# Patient Record
Sex: FEMALE | Race: WHITE | NOT HISPANIC OR LATINO | Employment: UNEMPLOYED | URBAN - METROPOLITAN AREA
[De-identification: names, ages, dates, MRNs, and addresses within clinical notes are randomized per-mention and may not be internally consistent; named-entity substitution may affect disease eponyms.]

---

## 2017-01-24 ENCOUNTER — OFFICE VISIT (OUTPATIENT)
Dept: OBSTETRICS AND GYNECOLOGY | Facility: CLINIC | Age: 67
End: 2017-01-24
Attending: OBSTETRICS & GYNECOLOGY
Payer: COMMERCIAL

## 2017-01-24 VITALS
BODY MASS INDEX: 27.22 KG/M2 | HEIGHT: 61 IN | WEIGHT: 144.19 LBS | DIASTOLIC BLOOD PRESSURE: 68 MMHG | SYSTOLIC BLOOD PRESSURE: 104 MMHG

## 2017-01-24 DIAGNOSIS — Z01.419 WELL WOMAN EXAM WITH ROUTINE GYNECOLOGICAL EXAM: Primary | ICD-10-CM

## 2017-01-24 DIAGNOSIS — Z79.890 POSTMENOPAUSAL HRT (HORMONE REPLACEMENT THERAPY): ICD-10-CM

## 2017-01-24 DIAGNOSIS — Z90.722 HISTORY OF TOTAL HYSTERECTOMY WITH BILATERAL SALPINGO-OOPHORECTOMY (BSO): ICD-10-CM

## 2017-01-24 DIAGNOSIS — Z12.31 VISIT FOR SCREENING MAMMOGRAM: ICD-10-CM

## 2017-01-24 DIAGNOSIS — Z90.710 HISTORY OF TOTAL HYSTERECTOMY WITH BILATERAL SALPINGO-OOPHORECTOMY (BSO): ICD-10-CM

## 2017-01-24 DIAGNOSIS — Z90.79 HISTORY OF TOTAL HYSTERECTOMY WITH BILATERAL SALPINGO-OOPHORECTOMY (BSO): ICD-10-CM

## 2017-01-24 PROCEDURE — 99999 PR PBB SHADOW E&M-EST. PATIENT-LVL III: CPT | Mod: PBBFAC,,, | Performed by: OBSTETRICS & GYNECOLOGY

## 2017-01-24 PROCEDURE — 99396 PREV VISIT EST AGE 40-64: CPT | Mod: S$GLB,,, | Performed by: OBSTETRICS & GYNECOLOGY

## 2017-01-24 RX ORDER — ESTRADIOL 1 MG/1
1 TABLET ORAL DAILY
Qty: 90 TABLET | Refills: 4 | Status: SHIPPED | OUTPATIENT
Start: 2017-01-24 | End: 2018-02-03 | Stop reason: SDUPTHER

## 2017-01-24 RX ORDER — LISINOPRIL AND HYDROCHLOROTHIAZIDE 10; 12.5 MG/1; MG/1
TABLET ORAL
Refills: 3 | COMMUNITY
Start: 2017-01-10 | End: 2019-04-02

## 2017-01-24 NOTE — PROGRESS NOTES
Emily John is a 66 y.o. year old  who presents for annual exam.  She is S/P SOBEIDA / BSO, currently taking Estrace 1 mg daily.  She continues to do well on ERT- no bleeding, no hot flashes.  Previously evaluated at [facility] for right breast mass 10/6/2016 with dx mammogram and breast ultrasound.  Denies changes in her medical / surgical history over the past year.  No GYN complaints.    Pap 2014: Negative    Past Medical History   Diagnosis Date    Acid reflux     Hypertension     Mental disorder        Past Surgical History   Procedure Laterality Date     section      Hysterectomy      Ankle surgery      Rotator cuff repair      Finger surgery       thumb       OB History      Para Term  AB TAB SAB Ectopic Multiple Living    2 2                  ROS:  GENERAL: Reports weight loss with diet.   SKIN: Denies rash or lesions.   HEAD: Denies head injury or headache.   NODES: Denies enlarged lymph nodes.   CHEST: Denies chest pain or shortness of breath.   CARDIOVASCULAR: Denies palpitations or left sided chest pain.   ABDOMEN: No abdominal pain, nausea.  Reports some constipation.  URINARY: No dysuria or hematuria.  REPRODUCTIVE: See HPI.   BREASTS: Denies pain or nipple discharge.  Reports right breast lump unchanged.   HEMATOLOGIC: No easy bruisability or excessive bleeding.   MUSCULOSKELETAL: Denies joint pain or swelling.   NEUROLOGIC: Denies syncope or weakness.   PSYCHIATRIC: Denies depression.    PE:  (chaperone present during entire exam)  APPEARANCE: Well nourished, well developed, in no acute distress.  NODES: No inguinal lymph node enlargement.  ABDOMEN: Soft. No tenderness or masses. No hernias.  BREASTS: Symmetrical, no skin changes or visible lesions. Right breast with 4-5 cm mass at 11:00 (unchanged from prior exam).  Otherwise, no palpable masses, nipple discharge or adenopathy bilaterally.  PELVIC: Atrophic external female genitalia without lesions. Normal hair  distribution. Adequate perineal body, normal urethral meatus. Vagina atrophic without lesions or discharge. No significant cystocele or rectocele. Uterus and cervix surgically absent. Bimanual exam revealed no masses, tenderness or abnormality.  ANUS: Normal.    Diagnosis:  1. Well woman exam with routine gynecological exam    2. Postmenopausal HRT (hormone replacement therapy)    3. History of total hysterectomy with bilateral salpingo-oophorectomy (BSO)    4. Visit for screening mammogram          PLAN:    Orders Placed This Encounter    Mammo Digital Screening Bilat with Arsen without CAD    estradiol (ESTRACE) 1 MG tablet       Patient was counseled today on postmenopausal issues including her usage of ERT: r / b / studies, WHI.  She is doing well on Estrace and desires to continue.  We also discussed the right breast mass previously evaluated at [facility].  She will have screening mammogram performed 1/31/17.    Follow-up in 1 year.

## 2017-01-31 ENCOUNTER — HOSPITAL ENCOUNTER (OUTPATIENT)
Dept: RADIOLOGY | Facility: HOSPITAL | Age: 67
Discharge: HOME | End: 2017-01-31
Attending: OBSTETRICS & GYNECOLOGY
Payer: COMMERCIAL

## 2017-01-31 DIAGNOSIS — Z12.31 VISIT FOR SCREENING MAMMOGRAM: ICD-10-CM

## 2017-01-31 PROCEDURE — 77067 SCR MAMMO BI INCL CAD: CPT | Mod: 26,,, | Performed by: RADIOLOGY

## 2017-01-31 PROCEDURE — 77067 SCR MAMMO BI INCL CAD: CPT | Mod: TC

## 2017-02-03 ENCOUNTER — PATIENT MESSAGE (OUTPATIENT)
Dept: OBSTETRICS AND GYNECOLOGY | Facility: CLINIC | Age: 67
End: 2017-02-03

## 2017-02-03 ENCOUNTER — HOSPITAL ENCOUNTER (OUTPATIENT)
Dept: RADIOLOGY | Facility: HOSPITAL | Age: 67
Discharge: HOME | End: 2017-02-03
Attending: OBSTETRICS & GYNECOLOGY
Payer: COMMERCIAL

## 2017-02-03 DIAGNOSIS — R92.8 ABNORMAL MAMMOGRAM OF LEFT BREAST: ICD-10-CM

## 2017-02-03 PROCEDURE — 77061 BREAST TOMOSYNTHESIS UNI: CPT | Mod: 26,LT,, | Performed by: RADIOLOGY

## 2017-02-03 PROCEDURE — 77065 DX MAMMO INCL CAD UNI: CPT | Mod: 26,LT,, | Performed by: RADIOLOGY

## 2017-02-03 PROCEDURE — 77061 BREAST TOMOSYNTHESIS UNI: CPT | Mod: TC,LT

## 2017-10-19 ENCOUNTER — OFFICE VISIT (OUTPATIENT)
Dept: OBSTETRICS AND GYNECOLOGY | Facility: CLINIC | Age: 67
End: 2017-10-19
Attending: OBSTETRICS & GYNECOLOGY
Payer: COMMERCIAL

## 2017-10-19 VITALS
WEIGHT: 141.13 LBS | SYSTOLIC BLOOD PRESSURE: 104 MMHG | HEIGHT: 61 IN | DIASTOLIC BLOOD PRESSURE: 64 MMHG | BODY MASS INDEX: 26.65 KG/M2

## 2017-10-19 DIAGNOSIS — Z78.0 POSTMENOPAUSAL STATUS: ICD-10-CM

## 2017-10-19 DIAGNOSIS — N63.10 BREAST MASS, RIGHT: Primary | ICD-10-CM

## 2017-10-19 PROCEDURE — 99999 PR PBB SHADOW E&M-EST. PATIENT-LVL III: CPT | Mod: PBBFAC,,, | Performed by: OBSTETRICS & GYNECOLOGY

## 2017-10-19 PROCEDURE — 99213 OFFICE O/P EST LOW 20 MIN: CPT | Mod: S$GLB,,, | Performed by: OBSTETRICS & GYNECOLOGY

## 2017-10-19 NOTE — PROGRESS NOTES
Chief Complaint   Patient presents with    Breast Pain       HPI:  Emily John is a 66 y.o. female patient  who presents today for evaluation of a right breast lump.  She has a history of a right breast mass followed at [facility].  Ultrasound 10/2016 revealed the mass to be 4 cm.  Over the past several months, she reports that the mass seems larger and has become tender.  She is now at the point that she may want drainage.  No LMP recorded. Patient has had a hysterectomy.     Breast sono 10/6/16:  Ultrasound is suggestive of an oval simple cyst with well defined, thin margins  measuring 4.2 cm seen in the posterior region of the right breast at 10 o'clock  located 10 centimeters from the nipple.  Internal echotexture is Anechoic.  There is enhancement.  This corresponds to the area of palpable concern.   Impression   Stable simple cyst in the right breast is benign-negative.  This corresponds to the area of palpable concern.  No suspicious finding in the  right breast.     Mammogram 17:  Finding 2:  Stable mass in the right breast is benign-negative.      Past Medical History:   Diagnosis Date    Acid reflux     Hypertension     Mental disorder        Past Surgical History:   Procedure Laterality Date    ANKLE SURGERY       SECTION      FINGER SURGERY      thumb    HYSTERECTOMY      ROTATOR CUFF REPAIR           ROS:  GENERAL: Feeling well overall.   SKIN: Denies rash or lesions.   HEAD: Denies head injury or headache.   NODES: Denies enlarged lymph nodes.   CHEST: Denies chest pain or shortness of breath.   CARDIOVASCULAR: Denies palpitations or left sided chest pain.   ABDOMEN: No abdominal pain, nausea, vomiting or rectal bleeding.   URINARY: No dysuria or hematuria.  REPRODUCTIVE: See HPI.   BREASTS: Reports enlarging tender right breast mass.  HEMATOLOGIC: No easy bruisability or excessive bleeding.   MUSCULOSKELETAL: Denies joint pain or swelling.   NEUROLOGIC: Denies syncope or  weakness.   PSYCHIATRIC: Denies depression.    PE:   (chaperone present during entire exam)  APPEARANCE: Well nourished, well developed, in no acute distress.  BREASTS:  Right breast with prominent 5-6 cm mass at 10:00, mildly tender.  Left breast without dominant masses.  No nipple discharge or adenopathy bilaterally.    Diagnosis:  1. Breast mass, right    2. Postmenopausal status          PLAN:    Orders Placed This Encounter    US Breast Right Complete    Mammo Digital Diagnostic Right with Arsen       Patient was counseled today on right breast mass and the need for follow-up at [facility] for evaluation / treatment.  Imaging studies scheduled 10/24/17.    Follow-up for annual exam    Total time of visit: 15 minutes (counseling >50% of time)

## 2017-10-24 ENCOUNTER — HOSPITAL ENCOUNTER (OUTPATIENT)
Dept: RADIOLOGY | Facility: HOSPITAL | Age: 67
Discharge: HOME | End: 2017-10-24
Attending: OBSTETRICS & GYNECOLOGY
Payer: COMMERCIAL

## 2017-10-24 VITALS — BODY MASS INDEX: 26.62 KG/M2 | HEIGHT: 61 IN | WEIGHT: 141 LBS

## 2017-10-24 DIAGNOSIS — N63.10 BREAST MASS, RIGHT: ICD-10-CM

## 2017-10-24 PROCEDURE — 77061 BREAST TOMOSYNTHESIS UNI: CPT | Mod: TC

## 2017-10-24 PROCEDURE — 76642 ULTRASOUND BREAST LIMITED: CPT | Mod: TC,RT

## 2017-10-24 PROCEDURE — 77065 DX MAMMO INCL CAD UNI: CPT | Mod: 26,RT,, | Performed by: RADIOLOGY

## 2017-10-24 PROCEDURE — 76642 ULTRASOUND BREAST LIMITED: CPT | Mod: 26,RT,, | Performed by: RADIOLOGY

## 2017-10-24 PROCEDURE — 77061 BREAST TOMOSYNTHESIS UNI: CPT | Mod: 26,RT,, | Performed by: RADIOLOGY

## 2017-11-06 ENCOUNTER — HOSPITAL ENCOUNTER (OUTPATIENT)
Dept: RADIOLOGY | Facility: HOSPITAL | Age: 67
Discharge: HOME | End: 2017-11-06
Attending: OBSTETRICS & GYNECOLOGY
Payer: COMMERCIAL

## 2017-11-06 DIAGNOSIS — N63.0 BREAST MASS: ICD-10-CM

## 2017-11-06 PROCEDURE — 19000 PUNCTURE ASPIR CYST BREAST: CPT | Mod: TC

## 2017-11-06 PROCEDURE — 19000 PUNCTURE ASPIR CYST BREAST: CPT | Mod: ,,, | Performed by: RADIOLOGY

## 2017-12-11 ENCOUNTER — HOSPITAL ENCOUNTER (EMERGENCY)
Facility: HOSPITAL | Age: 67
Discharge: HOME | End: 2017-12-11
Attending: EMERGENCY MEDICINE
Payer: MEDICARE

## 2017-12-11 VITALS
TEMPERATURE: 98 F | HEART RATE: 75 BPM | OXYGEN SATURATION: 97 % | DIASTOLIC BLOOD PRESSURE: 82 MMHG | SYSTOLIC BLOOD PRESSURE: 179 MMHG | BODY MASS INDEX: 26.06 KG/M2 | WEIGHT: 138 LBS | HEIGHT: 61 IN | RESPIRATION RATE: 20 BRPM

## 2017-12-11 DIAGNOSIS — S60.459A FOREIGN BODY OF FINGER: Primary | ICD-10-CM

## 2017-12-11 PROCEDURE — 63600175 PHARM REV CODE 636 W HCPCS: Performed by: PHYSICIAN ASSISTANT

## 2017-12-11 PROCEDURE — 25000003 PHARM REV CODE 250: Performed by: PHYSICIAN ASSISTANT

## 2017-12-11 PROCEDURE — 90715 TDAP VACCINE 7 YRS/> IM: CPT | Performed by: PHYSICIAN ASSISTANT

## 2017-12-11 PROCEDURE — 90471 IMMUNIZATION ADMIN: CPT | Performed by: PHYSICIAN ASSISTANT

## 2017-12-11 PROCEDURE — 99283 EMERGENCY DEPT VISIT LOW MDM: CPT

## 2017-12-11 PROCEDURE — 10121 INC&RMVL FB SUBQ TISS COMP: CPT

## 2017-12-11 PROCEDURE — 25000003 PHARM REV CODE 250: Performed by: EMERGENCY MEDICINE

## 2017-12-11 RX ORDER — CEPHALEXIN 250 MG/1
250 CAPSULE ORAL 4 TIMES DAILY
Qty: 28 CAPSULE | Refills: 0 | Status: SHIPPED | OUTPATIENT
Start: 2017-12-11 | End: 2017-12-11

## 2017-12-11 RX ORDER — LIDOCAINE HYDROCHLORIDE 10 MG/ML
10 INJECTION INFILTRATION; PERINEURAL
Status: COMPLETED | OUTPATIENT
Start: 2017-12-11 | End: 2017-12-11

## 2017-12-11 RX ORDER — CEPHALEXIN 250 MG/1
250 CAPSULE ORAL 4 TIMES DAILY
Qty: 28 CAPSULE | Refills: 0 | Status: SHIPPED | OUTPATIENT
Start: 2017-12-11 | End: 2017-12-18

## 2017-12-11 RX ORDER — IBUPROFEN 600 MG/1
600 TABLET ORAL EVERY 6 HOURS PRN
Qty: 20 TABLET | Refills: 0 | Status: SHIPPED | OUTPATIENT
Start: 2017-12-11 | End: 2017-12-11

## 2017-12-11 RX ORDER — CEPHALEXIN 250 MG/1
500 CAPSULE ORAL
Status: COMPLETED | OUTPATIENT
Start: 2017-12-11 | End: 2017-12-11

## 2017-12-11 RX ORDER — IBUPROFEN 600 MG/1
600 TABLET ORAL
Status: COMPLETED | OUTPATIENT
Start: 2017-12-11 | End: 2017-12-11

## 2017-12-11 RX ORDER — IBUPROFEN 600 MG/1
600 TABLET ORAL EVERY 6 HOURS PRN
Qty: 20 TABLET | Refills: 0 | Status: SHIPPED | OUTPATIENT
Start: 2017-12-11

## 2017-12-11 RX ADMIN — CEPHALEXIN 500 MG: 250 CAPSULE ORAL at 06:12

## 2017-12-11 RX ADMIN — CLOSTRIDIUM TETANI TOXOID ANTIGEN (FORMALDEHYDE INACTIVATED), CORYNEBACTERIUM DIPHTHERIAE TOXOID ANTIGEN (FORMALDEHYDE INACTIVATED), BORDETELLA PERTUSSIS TOXOID ANTIGEN (GLUTARALDEHYDE INACTIVATED), BORDETELLA PERTUSSIS FILAMENTOUS HEMAGGLUTININ ANTIGEN (FORMALDEHYDE INACTIVATED), BORDETELLA PERTUSSIS PERTACTIN ANTIGEN, AND BORDETELLA PERTUSSIS FIMBRIAE 2/3 ANTIGEN 0.5 ML: 5; 2; 2.5; 5; 3; 5 INJECTION, SUSPENSION INTRAMUSCULAR at 04:12

## 2017-12-11 RX ADMIN — IBUPROFEN 600 MG: 600 TABLET, FILM COATED ORAL at 06:12

## 2017-12-11 RX ADMIN — LIDOCAINE HYDROCHLORIDE 10 ML: 10 INJECTION, SOLUTION INFILTRATION; PERINEURAL at 04:12

## 2017-12-11 NOTE — ED PROVIDER NOTES
Encounter Date: 2017    SCRIBE #1 NOTE: IRoberto, am scribing for, and in the presence of,  Dianna Brown MD. I have scribed the following portions of the note - Other sections scribed: HPI, ROS.       History     Chief Complaint   Patient presents with    Foreign Body     tip of needle in thumb on left hand while sewing with machine     CC: Foreign Body     67 year old female  has a past medical history of Acid reflux; Breast cyst; Hypertension; and Mental disorder presents to the ED for evaluation of a needle in the ulnar aspect of her left thumb. Pt reports she was using her Embroidery machine when her thumb went under the needle; she reports the needle only penetrated once and broke. She reports associated numbness to her left thumb. Pt's tetanus shot is not up to date. No other symptoms reported. No attempts at medication reported.       The history is provided by the patient. No  was used.     Review of patient's allergies indicates:   Allergen Reactions    Penicillins Other (See Comments)     unknown     Past Medical History:   Diagnosis Date    Acid reflux     Breast cyst     Hypertension     Mental disorder      Past Surgical History:   Procedure Laterality Date    ANKLE SURGERY       SECTION      FINGER SURGERY      thumb    HYSTERECTOMY      OOPHORECTOMY      ROTATOR CUFF REPAIR       Family History   Problem Relation Age of Onset    Ovarian cancer Mother     Breast cancer Neg Hx     Colon cancer Neg Hx      Social History   Substance Use Topics    Smoking status: Former Smoker    Smokeless tobacco: Never Used    Alcohol use No     Review of Systems   Constitutional: Negative for fever.   HENT: Negative for sore throat.    Respiratory: Negative for shortness of breath.    Cardiovascular: Negative for chest pain.   Gastrointestinal: Negative for nausea.   Genitourinary: Negative for dysuria.   Musculoskeletal: Negative for back pain.   Skin:  Negative for rash.        (+) needle in the ulnar aspect of ulnar aspect of left thumb   Neurological: Negative for weakness.   Hematological: Does not bruise/bleed easily.       Physical Exam     Initial Vitals [12/11/17 1359]   BP Pulse Resp Temp SpO2   (!) 154/88 72 20 98.8 °F (37.1 °C) 97 %      MAP       110         Physical Exam    Nursing note and vitals reviewed.  Constitutional: She appears well-developed and well-nourished.   HENT:   Head: Normocephalic and atraumatic.   Nose: Nose normal.   Eyes: EOM and lids are normal.   Neck: Neck supple.   Cardiovascular: Normal rate and regular rhythm.   Pulmonary/Chest: No respiratory distress.   Abdominal: Soft. Normal appearance. There is no tenderness.   Musculoskeletal: Normal range of motion.   Wound w/ dry blood and exposed thread to the ulnar nail fold of the her left thumb  ecchymosis to palmar aspect of left thumb  ROM intact   Neurological: She is alert and oriented to person, place, and time. No sensory deficit.   Skin: Skin is warm and dry.   Psychiatric: She has a normal mood and affect. Her behavior is normal. Thought content normal.         ED Course   Foreign Body  Date/Time: 12/11/2017 5:00 PM  Performed by: CHAVO CADENA.  Authorized by: CHAVO CADENA.   Consent Done: Yes  Consent: Verbal consent obtained.  Risks and benefits: risks, benefits and alternatives were discussed  Consent given by: patient  Patient understanding: patient states understanding of the procedure being performed  Body area: skin  General location: upper extremity  Location details: left thumb  Anesthesia: digital block    Anesthesia:  Local Anesthetic: lidocaine 1% without epinephrine  Anesthetic total: 6 mL  Patient sedated: no  Patient restrained: no  Localization method: probed  Removal mechanism: forceps, hemostat, scalpel and irrigation  Dressing: dressing applied  Depth: deep  Complexity: complex  0 objects recovered.  Post-procedure assessment: foreign body not  removed  Patient tolerance: Patient tolerated the procedure well with no immediate complications  Comments: Wound explored, but unable to locate needle tip.  Case discussed with Dr. Felder from orthopedics who recommends starting patient on Keflex and sending her to his office tomorrow so she can be scheduled for operative removal of foreign body.      Labs Reviewed - No data to display          Medical Decision Making:   Initial Assessment:   Left thumb foreign body  Clinical Tests:   Radiological Study: Ordered and Reviewed  ED Management:  See procedure note.  Unable to locate foreign body with exploration.  Case discussed with Dr. Felder from orthopedics recommends starting on oral antibiotics and discharging to follow-up in orthopedics office in the morning.  Case discussed essentially with the patient who expresses understanding of the treatment plan.  Given prescription for Keflex and ibuprofen for pain and given strict instructions to return to the emergency room for fever, pus from wounds, or inability to see the orthopedic surgeon.  Other:   I discussed test(s) with the performing physician.       <> Summary of the Findings: Case discussed with Dr. Felder as above            Scribe Attestation:   Scribe #1: I performed the above scribed service and the documentation accurately describes the services I performed. I attest to the accuracy of the note.    Attending Attestation:           Physician Attestation for Scribe:  Physician Attestation Statement for Scribe #1: I, Dianna Brown MD, reviewed documentation, as scribed by Roberto Cortes in my presence, and it is both accurate and complete.                 ED Course      Clinical Impression:   The encounter diagnosis was Foreign body of finger.    Disposition:   Disposition: Discharged  Condition: Stable                        Dianna Morse MD  12/11/17 6056

## 2018-02-04 RX ORDER — ESTRADIOL 1 MG/1
TABLET ORAL
Qty: 90 TABLET | Refills: 1 | Status: SHIPPED | OUTPATIENT
Start: 2018-02-04 | End: 2018-03-13 | Stop reason: SDUPTHER

## 2018-03-13 ENCOUNTER — TELEPHONE (OUTPATIENT)
Dept: OBSTETRICS AND GYNECOLOGY | Facility: CLINIC | Age: 68
End: 2018-03-13

## 2018-03-13 ENCOUNTER — OFFICE VISIT (OUTPATIENT)
Dept: OBSTETRICS AND GYNECOLOGY | Facility: CLINIC | Age: 68
End: 2018-03-13
Attending: OBSTETRICS & GYNECOLOGY
Payer: MEDICARE

## 2018-03-13 VITALS
WEIGHT: 145.5 LBS | HEIGHT: 62 IN | BODY MASS INDEX: 26.78 KG/M2 | SYSTOLIC BLOOD PRESSURE: 118 MMHG | DIASTOLIC BLOOD PRESSURE: 74 MMHG

## 2018-03-13 DIAGNOSIS — Z80.41 FAMILY HISTORY OF OVARIAN CANCER: ICD-10-CM

## 2018-03-13 DIAGNOSIS — Z12.31 VISIT FOR SCREENING MAMMOGRAM: ICD-10-CM

## 2018-03-13 DIAGNOSIS — Z79.890 POSTMENOPAUSAL HRT (HORMONE REPLACEMENT THERAPY): Primary | ICD-10-CM

## 2018-03-13 DIAGNOSIS — Z79.890 POSTMENOPAUSAL HRT (HORMONE REPLACEMENT THERAPY): ICD-10-CM

## 2018-03-13 DIAGNOSIS — Z01.419 WELL WOMAN EXAM WITH ROUTINE GYNECOLOGICAL EXAM: Primary | ICD-10-CM

## 2018-03-13 DIAGNOSIS — Z90.710 HISTORY OF HYSTERECTOMY WITH BILATERAL OOPHORECTOMY: ICD-10-CM

## 2018-03-13 DIAGNOSIS — Z90.722 HISTORY OF HYSTERECTOMY WITH BILATERAL OOPHORECTOMY: ICD-10-CM

## 2018-03-13 PROCEDURE — 99999 PR PBB SHADOW E&M-EST. PATIENT-LVL III: CPT | Mod: PBBFAC,,, | Performed by: OBSTETRICS & GYNECOLOGY

## 2018-03-13 PROCEDURE — G0101 CA SCREEN;PELVIC/BREAST EXAM: HCPCS | Mod: S$GLB,,, | Performed by: OBSTETRICS & GYNECOLOGY

## 2018-03-13 RX ORDER — ESTRADIOL 1 MG/1
1 TABLET ORAL DAILY
Qty: 90 TABLET | Refills: 3 | Status: SHIPPED | OUTPATIENT
Start: 2018-03-13 | End: 2019-01-30 | Stop reason: SDUPTHER

## 2018-03-13 NOTE — TELEPHONE ENCOUNTER
----- Message from Indu Fatihll sent at 3/13/2018  3:32 PM CDT -----  Contact: pt  X_  1st Request  _  2nd Request  _  3rd Request    Who:VERONICA MOYA [[id number]]    Why: Patient states she is returning a call to inform the staff that she does need a refill on her hormone medication... Please contact to further discuss and advise     What Number to Call Back: [phone]    When to Expect a call back: (Before the end of the day)   -- if call after 3:00 call back will be tomorrow.

## 2018-03-13 NOTE — PROGRESS NOTES
Emily John is a 67 y.o. year old  female who presents for routine GYN exam.  S/P SOBEIDA / BSO , taking Estrace without any complaints.  No bleeding.  Denies hot flashes / sweats.  S/P aspiration of benign right breast cyst 2017.  S/P recent surgery on left thumb (Dr. Beatty).  She has a family history of ovarian cancer- mother.  No GYN complaints.    Pap 14: Negative    Past Medical History:   Diagnosis Date    Acid reflux     Breast cyst     Hypertension     Mental disorder        Past Surgical History:   Procedure Laterality Date    ANKLE SURGERY       SECTION      FINGER SURGERY      thumb    HYSTERECTOMY      OOPHORECTOMY      ROTATOR CUFF REPAIR         OB History      Para Term  AB Living    3 3 3          SAB TAB Ectopic Multiple Live Births                         ROS:  GENERAL: Feeling well overall.   SKIN: Denies rash or lesions.   HEAD: Denies head injury or headache.   NODES: Denies enlarged lymph nodes.   CHEST: Denies chest pain or shortness of breath.   CARDIOVASCULAR: Denies palpitations or left sided chest pain.   ABDOMEN: No abdominal pain, nausea, vomiting or rectal bleeding.   URINARY: No dysuria or hematuria.  REPRODUCTIVE: See HPI.   BREASTS: Denies pain, lumps, or nipple discharge.   HEMATOLOGIC: No easy bruisability or excessive bleeding.   MUSCULOSKELETAL: Reports discomfort in knees.  NEUROLOGIC: Denies syncope or weakness.   PSYCHIATRIC: Denies depression.     PE:   (chaperone present during entire exam)  APPEARANCE: Well nourished, well developed, in no acute distress.  BREASTS: Symmetrical, no skin changes or visible lesions. No palpable masses, nipple discharge or adenopathy bilaterally.  ABDOMEN: Soft. No tenderness or masses. No hernias. No CVA tenderness.  VULVA: No lesions. Normal female genitalia.  URETHRAL MEATUS: Normal size and location, no lesions, no prolapse.  URETHRA: No masses, tenderness, prolapse or scarring.  VAGINA:  Atrophic.  No lesions, no discharge, no significant cystocele or rectocele.  CERVIX / UTERUS: Surgically absent.  BME: No masses, tenderness or CDS nodularity.  ANUS PERINEUM: Normal.      Diagnosis:  1. Well woman exam with routine gynecological exam    2. History of hysterectomy with bilateral oophorectomy    3. Postmenopausal HRT (hormone replacement therapy)    4. Visit for screening mammogram    5. Family history of ovarian cancer          PLAN:    Orders Placed This Encounter    Mammo Digital Screening Bilat with Tomosynthesis CAD       Patient was counseled today on postmenopausal issues, including her usage of ERT: r / b / studies, WHI.  She is doing well on Estrace and desires to continue.  We also discussed her family history of ovarian cancer with her mother - declines referral to [facility] for genetic consultation.     Follow-up in 1 year.

## 2018-05-16 ENCOUNTER — HOSPITAL ENCOUNTER (OUTPATIENT)
Dept: RADIOLOGY | Facility: HOSPITAL | Age: 68
Discharge: HOME | End: 2018-05-16
Attending: OBSTETRICS & GYNECOLOGY
Payer: MEDICARE

## 2018-05-16 DIAGNOSIS — Z12.31 VISIT FOR SCREENING MAMMOGRAM: ICD-10-CM

## 2018-05-16 PROCEDURE — 77067 SCR MAMMO BI INCL CAD: CPT | Mod: 26,,, | Performed by: RADIOLOGY

## 2018-05-16 PROCEDURE — 77067 SCR MAMMO BI INCL CAD: CPT | Mod: TC,PO

## 2018-05-16 PROCEDURE — 77063 BREAST TOMOSYNTHESIS BI: CPT | Mod: 26,,, | Performed by: RADIOLOGY

## 2018-05-17 ENCOUNTER — PATIENT MESSAGE (OUTPATIENT)
Dept: OBSTETRICS AND GYNECOLOGY | Facility: CLINIC | Age: 68
End: 2018-05-17

## 2018-06-19 NOTE — ED TRIAGE NOTES
Piece of sewing needle stuck in left thumb   Alert and oriented x 3, normal mood and affect, no apparent risk to self or others.

## 2019-01-30 ENCOUNTER — TELEPHONE (OUTPATIENT)
Dept: OBSTETRICS AND GYNECOLOGY | Facility: CLINIC | Age: 69
End: 2019-01-30

## 2019-01-30 DIAGNOSIS — Z79.890 POSTMENOPAUSAL HRT (HORMONE REPLACEMENT THERAPY): ICD-10-CM

## 2019-01-30 RX ORDER — ESTRADIOL 1 MG/1
1 TABLET ORAL DAILY
Qty: 90 TABLET | Refills: 0 | Status: SHIPPED | OUTPATIENT
Start: 2019-01-30 | End: 2019-04-02 | Stop reason: SDUPTHER

## 2019-01-30 NOTE — TELEPHONE ENCOUNTER
----- Message from Porfirio Gaona sent at 1/30/2019  9:33 AM CST -----  Contact: VERONICA MOYA [[id number]]  Can the clinic reply in [facility]:Yes      Please refill the medication(s) listed below. Patient stated that she almost out of medicaition and pharmacy told her she need a new Pre Auth from Physician. Also, pt states that she still had a 90day refill with [employer] and confuse why Pharmacy wont refill. Pt request to do the 90day with [employer]. Please call the patient when the prescription(s) is ready for  at the phone number       Medication #1:estradiol (ESTRACE) 1 MG tablet      Medication #2:       Preferred Pharmacy:[address] [place], [place] [address] [address] [phone]

## 2019-04-02 ENCOUNTER — OFFICE VISIT (OUTPATIENT)
Dept: OBSTETRICS AND GYNECOLOGY | Facility: CLINIC | Age: 69
End: 2019-04-02
Attending: OBSTETRICS & GYNECOLOGY
Payer: MEDICARE

## 2019-04-02 VITALS
HEIGHT: 61 IN | BODY MASS INDEX: 28.89 KG/M2 | WEIGHT: 153 LBS | SYSTOLIC BLOOD PRESSURE: 140 MMHG | DIASTOLIC BLOOD PRESSURE: 70 MMHG

## 2019-04-02 DIAGNOSIS — Z01.419 WELL WOMAN EXAM WITH ROUTINE GYNECOLOGICAL EXAM: Primary | ICD-10-CM

## 2019-04-02 DIAGNOSIS — Z79.890 POSTMENOPAUSAL HRT (HORMONE REPLACEMENT THERAPY): ICD-10-CM

## 2019-04-02 DIAGNOSIS — Z90.710 HISTORY OF HYSTERECTOMY WITH BILATERAL OOPHORECTOMY: ICD-10-CM

## 2019-04-02 DIAGNOSIS — Z90.722 HISTORY OF HYSTERECTOMY WITH BILATERAL OOPHORECTOMY: ICD-10-CM

## 2019-04-02 DIAGNOSIS — Z12.31 VISIT FOR SCREENING MAMMOGRAM: ICD-10-CM

## 2019-04-02 PROCEDURE — G0101 PR CA SCREEN;PELVIC/BREAST EXAM: ICD-10-PCS | Mod: S$GLB,,, | Performed by: OBSTETRICS & GYNECOLOGY

## 2019-04-02 PROCEDURE — 99999 PR PBB SHADOW E&M-EST. PATIENT-LVL III: CPT | Mod: PBBFAC,,, | Performed by: OBSTETRICS & GYNECOLOGY

## 2019-04-02 PROCEDURE — 99999 PR PBB SHADOW E&M-EST. PATIENT-LVL III: ICD-10-PCS | Mod: PBBFAC,,, | Performed by: OBSTETRICS & GYNECOLOGY

## 2019-04-02 PROCEDURE — G0101 CA SCREEN;PELVIC/BREAST EXAM: HCPCS | Mod: S$GLB,,, | Performed by: OBSTETRICS & GYNECOLOGY

## 2019-04-02 RX ORDER — HYDROCHLOROTHIAZIDE 12.5 MG/1
12.5 CAPSULE ORAL DAILY
COMMUNITY

## 2019-04-02 RX ORDER — ESTRADIOL 1 MG/1
1 TABLET ORAL DAILY
Qty: 90 TABLET | Refills: 4 | Status: SHIPPED | OUTPATIENT
Start: 2019-04-02 | End: 2020-04-20 | Stop reason: SDUPTHER

## 2019-04-02 RX ORDER — ATORVASTATIN CALCIUM 10 MG/1
10 TABLET, FILM COATED ORAL DAILY
COMMUNITY

## 2019-04-02 NOTE — PROGRESS NOTES
Emily John is a 68 y.o. year old  who presents for annual exam.  She is S/P SOBEIDA / BSO in 2006 and continues on Estrace without any issues.  Denies bleeding, flashes, and sweats.  She has a family history of ovarian cancer with her mother.  Reports the development of low back, right hip pain radiating into her leg - she is scheduled to see Pain Management for evaluation.  No GYN complaints.    Pap 14: Negative    Mammogram 18: Negative      Past Medical History:   Diagnosis Date    Acid reflux     Breast cyst     Hypertension     Mental disorder        Past Surgical History:   Procedure Laterality Date    ANKLE SURGERY       SECTION      FINGER SURGERY      thumb    HYSTERECTOMY      OOPHORECTOMY      ROTATOR CUFF REPAIR         OB History        3    Para   3    Term   3            AB        Living   3       SAB        TAB        Ectopic        Multiple        Live Births   3                 ROS:  GENERAL: Feeling well overall.   SKIN: Denies rash or lesions.   HEAD: Denies head injury or headache.   NODES: Denies enlarged lymph nodes.   CHEST: Denies chest pain or shortness of breath.   CARDIOVASCULAR: Denies palpitations or left sided chest pain.   ABDOMEN: No abdominal pain, nausea, vomiting or rectal bleeding.   URINARY: No dysuria or hematuria.  REPRODUCTIVE: See HPI.   BREASTS: Denies pain, lumps, or nipple discharge.   HEMATOLOGIC: No easy bruisability or excessive bleeding.   MUSCULOSKELETAL: Reports low back, right hip, right leg discomfort.  NEUROLOGIC: Reports tingling in right leg.   PSYCHIATRIC: Denies depression.    PE:  (chaperone present during entire exam)  APPEARANCE: Well nourished, well developed, in no acute distress.  NODES: No inguinal lymph node enlargement.  ABDOMEN: Soft. No tenderness or masses. No hernias.  BREASTS: Symmetrical, no skin changes or visible lesions. No palpable masses, nipple discharge or adenopathy bilaterally.  PELVIC:  Atrophic external female genitalia without lesions. Normal hair distribution. Adequate perineal body, normal urethral meatus. Vagina atrophic without lesions or discharge. No significant cystocele or rectocele. Uterus and cervix surgically absent. Bimanual exam revealed no masses, tenderness or abnormality.  ANUS: Normal.    Diagnosis:  1. Well woman exam with routine gynecological exam    2. Postmenopausal HRT (hormone replacement therapy)    3. History of hysterectomy with bilateral oophorectomy    4. Visit for screening mammogram          PLAN:    Orders Placed This Encounter    Mammo Digital Screening Bilat w/ Arsen    estradiol (ESTRACE) 1 MG tablet       Patient was counseled today on postmenopausal issues and her usage of ERT: r / b / studies, WHI.  She continues to do well on Estrace and desires to continue.      Follow-up in 1 year.

## 2019-06-11 ENCOUNTER — HOSPITAL ENCOUNTER (OUTPATIENT)
Dept: RADIOLOGY | Facility: HOSPITAL | Age: 69
Discharge: HOME | End: 2019-06-11
Attending: OBSTETRICS & GYNECOLOGY
Payer: MEDICARE

## 2019-06-11 DIAGNOSIS — Z12.31 VISIT FOR SCREENING MAMMOGRAM: ICD-10-CM

## 2019-06-11 DIAGNOSIS — N63.0 LUMP OR MASS IN BREAST: ICD-10-CM

## 2019-06-11 DIAGNOSIS — N63.11 MASS OF UPPER OUTER QUADRANT OF RIGHT BREAST: ICD-10-CM

## 2019-06-12 ENCOUNTER — HOSPITAL ENCOUNTER (OUTPATIENT)
Dept: RADIOLOGY | Facility: HOSPITAL | Age: 69
Discharge: HOME | End: 2019-06-12
Attending: OBSTETRICS & GYNECOLOGY
Payer: MEDICARE

## 2019-06-12 DIAGNOSIS — N63.11 MASS OF UPPER OUTER QUADRANT OF RIGHT BREAST: ICD-10-CM

## 2019-06-12 DIAGNOSIS — N63.0 LUMP OR MASS IN BREAST: ICD-10-CM

## 2019-06-12 PROCEDURE — 77062 MAMMO DIGITAL DIAGNOSTIC BILAT WITH TOMOSYNTHESIS_CAD: ICD-10-PCS | Mod: 26,,, | Performed by: RADIOLOGY

## 2019-06-12 PROCEDURE — 77066 MAMMO DIGITAL DIAGNOSTIC BILAT WITH TOMOSYNTHESIS_CAD: ICD-10-PCS | Mod: 26,,, | Performed by: RADIOLOGY

## 2019-06-12 PROCEDURE — 76642 ULTRASOUND BREAST LIMITED: CPT | Mod: TC,PO,RT

## 2019-06-12 PROCEDURE — 76642 ULTRASOUND BREAST LIMITED: CPT | Mod: 26,RT,, | Performed by: RADIOLOGY

## 2019-06-12 PROCEDURE — 77066 DX MAMMO INCL CAD BI: CPT | Mod: TC,PO

## 2019-06-12 PROCEDURE — 77062 BREAST TOMOSYNTHESIS BI: CPT | Mod: 26,,, | Performed by: RADIOLOGY

## 2019-06-12 PROCEDURE — 76642 US BREAST RIGHT LIMITED: ICD-10-PCS | Mod: 26,RT,, | Performed by: RADIOLOGY

## 2019-06-12 PROCEDURE — 77066 DX MAMMO INCL CAD BI: CPT | Mod: 26,,, | Performed by: RADIOLOGY

## 2019-06-14 ENCOUNTER — TELEPHONE (OUTPATIENT)
Dept: OBSTETRICS AND GYNECOLOGY | Facility: CLINIC | Age: 69
End: 2019-06-14

## 2019-06-14 NOTE — TELEPHONE ENCOUNTER
Called patient:    Discussed results of diagnostic mammogram and breast ultrasound:    Impression:  Bilateral  There is no mammographic or sonographic evidence of malignancy.  BI-RADS Category:   Overall: 2 - Benign  Recommendation:  Routine screening mammogram in 1 year is recommended.  The patient's estimated lifetime risk of breast cancer (to age 85) based on Tyrer-Cuzick - 7 risk assessment model is: Tyrer-Cuzick: 6.2 %. According to the American Cancer Society,  patients with a lifetime breast cancer risk of 20% or higher might benefit from supplemental screening tests.

## 2020-04-20 DIAGNOSIS — Z79.890 POSTMENOPAUSAL HRT (HORMONE REPLACEMENT THERAPY): ICD-10-CM

## 2020-04-20 RX ORDER — ESTRADIOL 1 MG/1
1 TABLET ORAL DAILY
Qty: 90 TABLET | Refills: 0 | Status: SHIPPED | OUTPATIENT
Start: 2020-04-20 | End: 2020-05-13 | Stop reason: SDUPTHER

## 2020-04-20 NOTE — TELEPHONE ENCOUNTER
----- Message from Leyda Plascencia, Patient Care Assistant sent at 4/20/2020 10:30 AM CDT -----  Contact: VERONICA MOYA [[id number]]  Can the clinic reply in [facility]: No    Please refill the medication(s) listed below. The patient can be reached at this phone number once it is called into the pharmacy.[phone]    Medication #1estradiol (ESTRACE) 1 MG tablet    Medication #2    Preferred Pharmacy:   [facility] - [facility] [address]

## 2020-05-07 ENCOUNTER — TELEPHONE (OUTPATIENT)
Dept: OBSTETRICS AND GYNECOLOGY | Facility: CLINIC | Age: 70
End: 2020-05-07

## 2020-05-07 NOTE — TELEPHONE ENCOUNTER
----- Message from Jacqueline Flores sent at 5/7/2020  4:07 PM CDT -----  Contact: VERONICA MOYA [[id number]]  Name of Who is Calling: VERONICA MOYA [[id number]]    What is the request in detail:VERONICA MOYA [[id number]] is requesting a call back in regards to Annual ...  Please contact to further discuss and advise      Can the clinic reply by [facility]: no     What Number to Call Back if not in [facility]:  [phone]

## 2020-05-13 ENCOUNTER — OFFICE VISIT (OUTPATIENT)
Dept: OBSTETRICS AND GYNECOLOGY | Facility: CLINIC | Age: 70
End: 2020-05-13
Attending: OBSTETRICS & GYNECOLOGY
Payer: MEDICARE

## 2020-05-13 VITALS
WEIGHT: 163.38 LBS | HEIGHT: 61 IN | BODY MASS INDEX: 30.85 KG/M2 | DIASTOLIC BLOOD PRESSURE: 70 MMHG | SYSTOLIC BLOOD PRESSURE: 124 MMHG

## 2020-05-13 DIAGNOSIS — Z01.419 WELL WOMAN EXAM WITH ROUTINE GYNECOLOGICAL EXAM: Primary | ICD-10-CM

## 2020-05-13 DIAGNOSIS — Z90.722 HISTORY OF HYSTERECTOMY WITH BILATERAL OOPHORECTOMY: ICD-10-CM

## 2020-05-13 DIAGNOSIS — Z90.710 HISTORY OF HYSTERECTOMY WITH BILATERAL OOPHORECTOMY: ICD-10-CM

## 2020-05-13 DIAGNOSIS — N64.4 BREAST PAIN, RIGHT: ICD-10-CM

## 2020-05-13 DIAGNOSIS — Z79.890 POSTMENOPAUSAL HRT (HORMONE REPLACEMENT THERAPY): ICD-10-CM

## 2020-05-13 PROCEDURE — G0101 PR CA SCREEN;PELVIC/BREAST EXAM: ICD-10-PCS | Mod: S$GLB,,, | Performed by: OBSTETRICS & GYNECOLOGY

## 2020-05-13 PROCEDURE — G0101 CA SCREEN;PELVIC/BREAST EXAM: HCPCS | Mod: S$GLB,,, | Performed by: OBSTETRICS & GYNECOLOGY

## 2020-05-13 PROCEDURE — 99999 PR PBB SHADOW E&M-EST. PATIENT-LVL III: ICD-10-PCS | Mod: PBBFAC,,, | Performed by: OBSTETRICS & GYNECOLOGY

## 2020-05-13 PROCEDURE — 99999 PR PBB SHADOW E&M-EST. PATIENT-LVL III: CPT | Mod: PBBFAC,,, | Performed by: OBSTETRICS & GYNECOLOGY

## 2020-05-13 RX ORDER — ESTRADIOL 1 MG/1
1 TABLET ORAL DAILY
Qty: 90 TABLET | Refills: 4 | Status: SHIPPED | OUTPATIENT
Start: 2020-05-13 | End: 2021-05-26 | Stop reason: SDUPTHER

## 2020-05-13 RX ORDER — GLUCOSAMINE/CHONDRO SU A 500-400 MG
1 TABLET ORAL
COMMUNITY

## 2020-05-13 RX ORDER — CETIRIZINE HYDROCHLORIDE 10 MG/1
10 TABLET ORAL NIGHTLY
COMMUNITY
Start: 2020-03-06

## 2020-05-13 RX ORDER — PERPHENAZINE/AMITRIPTYLINE HCL 4MG-10MG
TABLET ORAL
COMMUNITY
End: 2023-09-20

## 2020-05-13 RX ORDER — ACETAMINOPHEN AND PHENYLEPHRINE HCL 325; 5 MG/1; MG/1
TABLET ORAL
COMMUNITY
End: 2023-09-20

## 2020-05-13 RX ORDER — MINERAL OIL
180 ENEMA (ML) RECTAL EVERY MORNING
COMMUNITY
Start: 2020-03-06

## 2020-05-13 RX ORDER — HYDROXYZINE HYDROCHLORIDE 25 MG/1
25 TABLET, FILM COATED ORAL
COMMUNITY
End: 2023-09-20

## 2020-05-13 RX ORDER — CALCIUM CARBONATE/VITAMIN D3 600MG-5MCG
TABLET ORAL
COMMUNITY

## 2020-05-13 NOTE — PROGRESS NOTES
Emily John is a 69 y.o. year old  who presents for annual exam.  S/P SOBEIDA / BSO .  She continues on Estrace without any problems.  Denies bleeding, night sweats, and hot flashes.  For the past 3 months, she describes having right breast tenderness, but denies feeling any masses or lumps.  Family history of ovarian cancer with her mother.  Reports recent injection for sciatic discomfort.  No GYN complaints.    Pap 14: Negative    Past Medical History:   Diagnosis Date    Acid reflux     Breast cyst     Hypertension     Mental disorder        Past Surgical History:   Procedure Laterality Date    ANKLE SURGERY       SECTION      FINGER SURGERY      thumb    HYSTERECTOMY      OOPHORECTOMY      ROTATOR CUFF REPAIR         OB History        3    Para   3    Term   3            AB        Living   3       SAB        TAB        Ectopic        Multiple        Live Births   3                 ROS:  GENERAL: Reports weight gain.   SKIN: Denies rash or lesions.   HEAD: Denies head injury or headache.   NODES: Denies enlarged lymph nodes.   CHEST: Denies chest pain or shortness of breath.   CARDIOVASCULAR: Denies palpitations or left sided chest pain.   ABDOMEN: No abdominal pain, nausea, vomiting or rectal bleeding.   URINARY: No dysuria or hematuria.  REPRODUCTIVE: See HPI.   BREASTS: Reports right breast discomfort. Denies lumps or nipple discharge.   HEMATOLOGIC: No easy bruisability or excessive bleeding.   MUSCULOSKELETAL: Denies joint pain or swelling.   NEUROLOGIC: Reports some back discomfort.   PSYCHIATRIC: Denies depression.    PE:  (chaperone present during entire exam)  APPEARANCE: Well nourished, well developed, in no acute distress.  NODES: No inguinal lymph node enlargement.  ABDOMEN: Soft. No tenderness or masses. No hernias.  BREASTS: Symmetrical, no skin changes or visible lesions. Generalized nodularity throughout both breasts, but no palpable dominant masses  bilaterally.  Right lower / mid breast with tenderness.  No nipple discharge or adenopathy bilaterally.  PELVIC: Atrophic external female genitalia without lesions. Normal hair distribution. Adequate perineal body, normal urethral meatus. Vagina atrophic without lesions or discharge. No significant cystocele or rectocele. Uterus and cervix surgically absent. Bimanual exam revealed no masses, tenderness or abnormality.  ANUS: Normal.      Diagnosis:  1. Well woman exam with routine gynecological exam    2. Postmenopausal HRT (hormone replacement therapy)    3. History of hysterectomy with bilateral oophorectomy    4. Breast pain, right          PLAN:    Orders Placed This Encounter    US Breast Right Complete    Mammo Digital Diagnostic Bilat w/ Arsen    estradioL (ESTRACE) 1 MG tablet       Patient was counseled today on postmenopausal issues and her usage of ERT: r / b / studies.  She is doing well with Estrace and desires to continue.  We also discussed her breast symptoms and exam findings.  She will have a diagnostic mammogram and breast ultrasound performed for further evaluation.    Follow-up imaging.  Annual exam in one year.

## 2020-05-15 ENCOUNTER — HOSPITAL ENCOUNTER (OUTPATIENT)
Dept: RADIOLOGY | Facility: HOSPITAL | Age: 70
Discharge: HOME | End: 2020-05-15
Attending: OBSTETRICS & GYNECOLOGY
Payer: MEDICARE

## 2020-05-15 DIAGNOSIS — N64.4 BREAST PAIN, RIGHT: ICD-10-CM

## 2020-05-15 PROCEDURE — 76642 ULTRASOUND BREAST LIMITED: CPT | Mod: TC,PO,RT

## 2020-05-15 PROCEDURE — 76642 US BREAST RIGHT LIMITED: ICD-10-PCS | Mod: 26,RT,, | Performed by: RADIOLOGY

## 2020-05-15 PROCEDURE — 77062 MAMMO DIGITAL DIAGNOSTIC BILAT WITH TOMOSYNTHESIS_CAD: ICD-10-PCS | Mod: 26,,, | Performed by: RADIOLOGY

## 2020-05-15 PROCEDURE — 77062 BREAST TOMOSYNTHESIS BI: CPT | Mod: TC,PO

## 2020-05-15 PROCEDURE — 77066 MAMMO DIGITAL DIAGNOSTIC BILAT WITH TOMOSYNTHESIS_CAD: ICD-10-PCS | Mod: 26,,, | Performed by: RADIOLOGY

## 2020-05-15 PROCEDURE — 77062 BREAST TOMOSYNTHESIS BI: CPT | Mod: 26,,, | Performed by: RADIOLOGY

## 2020-05-15 PROCEDURE — 77066 DX MAMMO INCL CAD BI: CPT | Mod: 26,,, | Performed by: RADIOLOGY

## 2020-05-15 PROCEDURE — 76642 ULTRASOUND BREAST LIMITED: CPT | Mod: 26,RT,, | Performed by: RADIOLOGY

## 2020-05-16 ENCOUNTER — PATIENT MESSAGE (OUTPATIENT)
Dept: OBSTETRICS AND GYNECOLOGY | Facility: CLINIC | Age: 70
End: 2020-05-16

## 2020-05-26 ENCOUNTER — PATIENT MESSAGE (OUTPATIENT)
Dept: OBSTETRICS AND GYNECOLOGY | Facility: CLINIC | Age: 70
End: 2020-05-26

## 2020-06-04 ENCOUNTER — LAB VISIT (OUTPATIENT)
Dept: PRIMARY CARE CLINIC | Facility: OTHER | Age: 70
End: 2020-06-04
Payer: MEDICARE

## 2020-06-04 DIAGNOSIS — Z03.818 ENCOUNTER FOR OBSERVATION FOR SUSPECTED EXPOSURE TO OTHER BIOLOGICAL AGENTS RULED OUT: ICD-10-CM

## 2020-06-04 PROCEDURE — U0003 INFECTIOUS AGENT DETECTION BY NUCLEIC ACID (DNA OR RNA); SEVERE ACUTE RESPIRATORY SYNDROME CORONAVIRUS 2 (SARS-COV-2) (CORONAVIRUS DISEASE [COVID-19]), AMPLIFIED PROBE TECHNIQUE, MAKING USE OF HIGH THROUGHPUT TECHNOLOGIES AS DESCRIBED BY CMS-2020-01-R: HCPCS

## 2020-06-05 LAB — SARS-COV-2 RNA RESP QL NAA+PROBE: NOT DETECTED

## 2021-05-26 ENCOUNTER — OFFICE VISIT (OUTPATIENT)
Dept: OBSTETRICS AND GYNECOLOGY | Facility: CLINIC | Age: 71
End: 2021-05-26
Attending: OBSTETRICS & GYNECOLOGY
Payer: MEDICARE

## 2021-05-26 VITALS — WEIGHT: 186.31 LBS | SYSTOLIC BLOOD PRESSURE: 132 MMHG | BODY MASS INDEX: 35.2 KG/M2 | DIASTOLIC BLOOD PRESSURE: 88 MMHG

## 2021-05-26 DIAGNOSIS — Z12.31 VISIT FOR SCREENING MAMMOGRAM: ICD-10-CM

## 2021-05-26 DIAGNOSIS — Z90.710 HISTORY OF HYSTERECTOMY WITH BILATERAL OOPHORECTOMY: ICD-10-CM

## 2021-05-26 DIAGNOSIS — Z90.722 HISTORY OF HYSTERECTOMY WITH BILATERAL OOPHORECTOMY: ICD-10-CM

## 2021-05-26 DIAGNOSIS — Z79.890 POSTMENOPAUSAL HRT (HORMONE REPLACEMENT THERAPY): ICD-10-CM

## 2021-05-26 DIAGNOSIS — Z01.419 WOMEN'S ANNUAL ROUTINE GYNECOLOGICAL EXAMINATION: Primary | ICD-10-CM

## 2021-05-26 PROCEDURE — 3008F PR BODY MASS INDEX (BMI) DOCUMENTED: ICD-10-PCS | Mod: CPTII,S$GLB,, | Performed by: OBSTETRICS & GYNECOLOGY

## 2021-05-26 PROCEDURE — 99999 PR PBB SHADOW E&M-EST. PATIENT-LVL III: ICD-10-PCS | Mod: PBBFAC,,, | Performed by: OBSTETRICS & GYNECOLOGY

## 2021-05-26 PROCEDURE — 1126F AMNT PAIN NOTED NONE PRSNT: CPT | Mod: S$GLB,,, | Performed by: OBSTETRICS & GYNECOLOGY

## 2021-05-26 PROCEDURE — G0101 PR CA SCREEN;PELVIC/BREAST EXAM: ICD-10-PCS | Mod: S$GLB,,, | Performed by: OBSTETRICS & GYNECOLOGY

## 2021-05-26 PROCEDURE — G0101 CA SCREEN;PELVIC/BREAST EXAM: HCPCS | Mod: S$GLB,,, | Performed by: OBSTETRICS & GYNECOLOGY

## 2021-05-26 PROCEDURE — 99999 PR PBB SHADOW E&M-EST. PATIENT-LVL III: CPT | Mod: PBBFAC,,, | Performed by: OBSTETRICS & GYNECOLOGY

## 2021-05-26 PROCEDURE — 1126F PR PAIN SEVERITY QUANTIFIED, NO PAIN PRESENT: ICD-10-PCS | Mod: S$GLB,,, | Performed by: OBSTETRICS & GYNECOLOGY

## 2021-05-26 PROCEDURE — 3008F BODY MASS INDEX DOCD: CPT | Mod: CPTII,S$GLB,, | Performed by: OBSTETRICS & GYNECOLOGY

## 2021-05-26 RX ORDER — ESTRADIOL 1 MG/1
1 TABLET ORAL DAILY
Qty: 90 TABLET | Refills: 4 | Status: SHIPPED | OUTPATIENT
Start: 2021-05-26 | End: 2022-05-30 | Stop reason: SDUPTHER

## 2021-05-26 RX ORDER — MECLIZINE HCL 12.5 MG 12.5 MG/1
TABLET ORAL
COMMUNITY
Start: 2021-01-18

## 2021-06-14 ENCOUNTER — HOSPITAL ENCOUNTER (OUTPATIENT)
Dept: RADIOLOGY | Facility: HOSPITAL | Age: 71
Discharge: HOME | End: 2021-06-14
Attending: OBSTETRICS & GYNECOLOGY
Payer: MEDICARE

## 2021-06-14 VITALS — WEIGHT: 186 LBS | BODY MASS INDEX: 35.12 KG/M2 | HEIGHT: 61 IN

## 2021-06-14 DIAGNOSIS — Z12.31 VISIT FOR SCREENING MAMMOGRAM: ICD-10-CM

## 2021-06-14 PROCEDURE — 77063 BREAST TOMOSYNTHESIS BI: CPT | Mod: 26,,, | Performed by: RADIOLOGY

## 2021-06-14 PROCEDURE — 77067 MAMMO DIGITAL SCREENING BILAT WITH TOMO: ICD-10-PCS | Mod: 26,,, | Performed by: RADIOLOGY

## 2021-06-14 PROCEDURE — 77063 MAMMO DIGITAL SCREENING BILAT WITH TOMO: ICD-10-PCS | Mod: 26,,, | Performed by: RADIOLOGY

## 2021-06-14 PROCEDURE — 77067 SCR MAMMO BI INCL CAD: CPT | Mod: 26,,, | Performed by: RADIOLOGY

## 2021-06-14 PROCEDURE — 77067 SCR MAMMO BI INCL CAD: CPT | Mod: TC,PO

## 2021-06-15 ENCOUNTER — PATIENT MESSAGE (OUTPATIENT)
Dept: OBSTETRICS AND GYNECOLOGY | Facility: CLINIC | Age: 71
End: 2021-06-15

## 2022-05-29 NOTE — PROGRESS NOTES
Emily John is a 71 y.o. year old  who presents for annual exam.  S/P SOBEIDA / BSO  and continues on Estrace 1 mg  without any bleeding, flashes, and sweats.  Family history of ovarian cancer with her mother.  Reports some urine loss with cough / sneeze but does not feel that she needs evaluation at this time.  Describes having neck injection several months ago.  Otherwise, denies recent changes in her medical / surgical history.  No GYN complaints.    21 Mammogram: Negative, TC 7.89%    14 Pap: Negative    Past Medical History:   Diagnosis Date    Acid reflux     Breast cyst     Hypertension     Mental disorder        Past Surgical History:   Procedure Laterality Date    ANKLE SURGERY       SECTION      FINGER SURGERY      thumb    HYSTERECTOMY      OOPHORECTOMY      ROTATOR CUFF REPAIR         OB History        3    Para   3    Term   3            AB        Living   3       SAB        IAB        Ectopic        Multiple        Live Births   3                 ROS:  GENERAL: Feeling well overall.   SKIN: Denies rash or lesions.   HEAD: Denies head injury or headache.   NODES: Denies enlarged lymph nodes.   CHEST: Denies chest pain or shortness of breath.   CARDIOVASCULAR: Denies palpitations or left sided chest pain.   ABDOMEN: No abdominal pain, nausea, vomiting or rectal bleeding.   URINARY:  Reports some urine loss with cough and sneeze.  REPRODUCTIVE: See HPI.   BREASTS: Denies pain, lumps, or nipple discharge.   HEMATOLOGIC: No easy bruisability or excessive bleeding.   MUSCULOSKELETAL: Reports neck / back discomfort.   NEUROLOGIC: Denies syncope or weakness.   PSYCHIATRIC: Denies depression.    PE:  (chaperone present during entire exam)  APPEARANCE: Well nourished, well developed, in no acute distress.  NODES: No inguinal lymph node enlargement.  ABDOMEN: Soft. No tenderness or masses. No hernias.  BREASTS: Symmetrical, no skin changes or visible lesions. No  palpable masses, nipple discharge or adenopathy bilaterally.  PELVIC: Normal atrophic external female genitalia without lesions. Normal hair distribution. Adequate perineal body, normal urethral meatus. Vagina atrophic without lesions or discharge. No significant cystocele or rectocele. Uterus and cervix surgically absent. Bimanual exam revealed no masses, tenderness or abnormality.  ANUS: Normal.    Diagnosis:  1. Postmenopausal HRT (hormone replacement therapy)    2. Women's annual routine gynecological examination    3. History of hysterectomy with bilateral oophorectomy    4. Visit for screening mammogram    5. Family history of ovarian cancer          PLAN:    Orders Placed This Encounter    Mammo Digital Screening Bilat w/ Arsen    estradioL (ESTRACE) 1 MG tablet       Patient was counseled today on postmenopausal issues including her usage of ERT: risks and benefits, studies- WHI.  She is doing well on Estrace and desires to continue.  She will monitor her urinary symptoms and let us know if she desires referral to gyn Urology.      Follow-up in 1 year.

## 2022-05-30 ENCOUNTER — OFFICE VISIT (OUTPATIENT)
Dept: OBSTETRICS AND GYNECOLOGY | Facility: CLINIC | Age: 72
End: 2022-05-30
Payer: MEDICARE

## 2022-05-30 VITALS
SYSTOLIC BLOOD PRESSURE: 130 MMHG | WEIGHT: 186.31 LBS | BODY MASS INDEX: 35.18 KG/M2 | HEIGHT: 61 IN | DIASTOLIC BLOOD PRESSURE: 80 MMHG

## 2022-05-30 DIAGNOSIS — Z80.41 FAMILY HISTORY OF OVARIAN CANCER: ICD-10-CM

## 2022-05-30 DIAGNOSIS — Z12.31 VISIT FOR SCREENING MAMMOGRAM: ICD-10-CM

## 2022-05-30 DIAGNOSIS — Z79.890 POSTMENOPAUSAL HRT (HORMONE REPLACEMENT THERAPY): ICD-10-CM

## 2022-05-30 DIAGNOSIS — Z01.419 WOMEN'S ANNUAL ROUTINE GYNECOLOGICAL EXAMINATION: Primary | ICD-10-CM

## 2022-05-30 DIAGNOSIS — Z90.722 HISTORY OF HYSTERECTOMY WITH BILATERAL OOPHORECTOMY: ICD-10-CM

## 2022-05-30 DIAGNOSIS — Z90.710 HISTORY OF HYSTERECTOMY WITH BILATERAL OOPHORECTOMY: ICD-10-CM

## 2022-05-30 PROCEDURE — G0101 CA SCREEN;PELVIC/BREAST EXAM: HCPCS | Mod: S$GLB,,, | Performed by: OBSTETRICS & GYNECOLOGY

## 2022-05-30 PROCEDURE — 3008F BODY MASS INDEX DOCD: CPT | Mod: CPTII,S$GLB,, | Performed by: OBSTETRICS & GYNECOLOGY

## 2022-05-30 PROCEDURE — 3079F DIAST BP 80-89 MM HG: CPT | Mod: CPTII,S$GLB,, | Performed by: OBSTETRICS & GYNECOLOGY

## 2022-05-30 PROCEDURE — 3008F PR BODY MASS INDEX (BMI) DOCUMENTED: ICD-10-PCS | Mod: CPTII,S$GLB,, | Performed by: OBSTETRICS & GYNECOLOGY

## 2022-05-30 PROCEDURE — 1126F AMNT PAIN NOTED NONE PRSNT: CPT | Mod: CPTII,S$GLB,, | Performed by: OBSTETRICS & GYNECOLOGY

## 2022-05-30 PROCEDURE — 1101F PT FALLS ASSESS-DOCD LE1/YR: CPT | Mod: CPTII,S$GLB,, | Performed by: OBSTETRICS & GYNECOLOGY

## 2022-05-30 PROCEDURE — 99999 PR PBB SHADOW E&M-EST. PATIENT-LVL III: ICD-10-PCS | Mod: PBBFAC,,, | Performed by: OBSTETRICS & GYNECOLOGY

## 2022-05-30 PROCEDURE — 1160F PR REVIEW ALL MEDS BY PRESCRIBER/CLIN PHARMACIST DOCUMENTED: ICD-10-PCS | Mod: CPTII,S$GLB,, | Performed by: OBSTETRICS & GYNECOLOGY

## 2022-05-30 PROCEDURE — G0101 PR CA SCREEN;PELVIC/BREAST EXAM: ICD-10-PCS | Mod: S$GLB,,, | Performed by: OBSTETRICS & GYNECOLOGY

## 2022-05-30 PROCEDURE — 3288F PR FALLS RISK ASSESSMENT DOCUMENTED: ICD-10-PCS | Mod: CPTII,S$GLB,, | Performed by: OBSTETRICS & GYNECOLOGY

## 2022-05-30 PROCEDURE — 1101F PR PT FALLS ASSESS DOC 0-1 FALLS W/OUT INJ PAST YR: ICD-10-PCS | Mod: CPTII,S$GLB,, | Performed by: OBSTETRICS & GYNECOLOGY

## 2022-05-30 PROCEDURE — 1126F PR PAIN SEVERITY QUANTIFIED, NO PAIN PRESENT: ICD-10-PCS | Mod: CPTII,S$GLB,, | Performed by: OBSTETRICS & GYNECOLOGY

## 2022-05-30 PROCEDURE — 3075F PR MOST RECENT SYSTOLIC BLOOD PRESS GE 130-139MM HG: ICD-10-PCS | Mod: CPTII,S$GLB,, | Performed by: OBSTETRICS & GYNECOLOGY

## 2022-05-30 PROCEDURE — 1159F PR MEDICATION LIST DOCUMENTED IN MEDICAL RECORD: ICD-10-PCS | Mod: CPTII,S$GLB,, | Performed by: OBSTETRICS & GYNECOLOGY

## 2022-05-30 PROCEDURE — 3288F FALL RISK ASSESSMENT DOCD: CPT | Mod: CPTII,S$GLB,, | Performed by: OBSTETRICS & GYNECOLOGY

## 2022-05-30 PROCEDURE — 1159F MED LIST DOCD IN RCRD: CPT | Mod: CPTII,S$GLB,, | Performed by: OBSTETRICS & GYNECOLOGY

## 2022-05-30 PROCEDURE — 3075F SYST BP GE 130 - 139MM HG: CPT | Mod: CPTII,S$GLB,, | Performed by: OBSTETRICS & GYNECOLOGY

## 2022-05-30 PROCEDURE — 99999 PR PBB SHADOW E&M-EST. PATIENT-LVL III: CPT | Mod: PBBFAC,,, | Performed by: OBSTETRICS & GYNECOLOGY

## 2022-05-30 PROCEDURE — 3079F PR MOST RECENT DIASTOLIC BLOOD PRESSURE 80-89 MM HG: ICD-10-PCS | Mod: CPTII,S$GLB,, | Performed by: OBSTETRICS & GYNECOLOGY

## 2022-05-30 PROCEDURE — 1160F RVW MEDS BY RX/DR IN RCRD: CPT | Mod: CPTII,S$GLB,, | Performed by: OBSTETRICS & GYNECOLOGY

## 2022-05-30 RX ORDER — ESTRADIOL 1 MG/1
1 TABLET ORAL DAILY
Qty: 90 TABLET | Refills: 4 | Status: SHIPPED | OUTPATIENT
Start: 2022-05-30 | End: 2023-09-20 | Stop reason: SDUPTHER

## 2022-06-30 ENCOUNTER — HOSPITAL ENCOUNTER (OUTPATIENT)
Dept: RADIOLOGY | Facility: HOSPITAL | Age: 72
Discharge: HOME | End: 2022-06-30
Attending: OBSTETRICS & GYNECOLOGY
Payer: MEDICARE

## 2022-06-30 VITALS — HEIGHT: 61 IN | WEIGHT: 186.31 LBS | BODY MASS INDEX: 35.18 KG/M2

## 2022-06-30 DIAGNOSIS — Z12.31 VISIT FOR SCREENING MAMMOGRAM: ICD-10-CM

## 2022-06-30 PROCEDURE — 77067 SCR MAMMO BI INCL CAD: CPT | Mod: 26,,, | Performed by: RADIOLOGY

## 2022-06-30 PROCEDURE — 77067 MAMMO DIGITAL SCREENING BILAT WITH TOMO: ICD-10-PCS | Mod: 26,,, | Performed by: RADIOLOGY

## 2022-06-30 PROCEDURE — 77063 BREAST TOMOSYNTHESIS BI: CPT | Mod: TC,PO

## 2022-06-30 PROCEDURE — 77063 MAMMO DIGITAL SCREENING BILAT WITH TOMO: ICD-10-PCS | Mod: 26,,, | Performed by: RADIOLOGY

## 2022-06-30 PROCEDURE — 77063 BREAST TOMOSYNTHESIS BI: CPT | Mod: 26,,, | Performed by: RADIOLOGY

## 2022-06-30 PROCEDURE — 77067 SCR MAMMO BI INCL CAD: CPT | Mod: TC,PO

## 2022-07-06 ENCOUNTER — PATIENT MESSAGE (OUTPATIENT)
Dept: OBSTETRICS AND GYNECOLOGY | Facility: CLINIC | Age: 72
End: 2022-07-06
Payer: MEDICARE

## 2023-07-18 ENCOUNTER — TELEPHONE (OUTPATIENT)
Dept: OBSTETRICS AND GYNECOLOGY | Facility: CLINIC | Age: 73
End: 2023-07-18
Payer: MEDICARE

## 2023-07-18 DIAGNOSIS — Z12.31 SCREENING MAMMOGRAM FOR BREAST CANCER: Primary | ICD-10-CM

## 2023-07-18 NOTE — TELEPHONE ENCOUNTER
----- Message from Franny Tate sent at 7/18/2023  2:45 PM CDT -----  Type:  Mammogram    Caller is requesting to schedule their annual mammogram appointment.  Order is not listed in EPIC.  Please enter order and contact patient to schedule.  Name of Caller:pt   Where would they like the mammogram performed? [facility]   Would the patient rather a call back or a response via [facility]? Call  Best Call Back Number:[phone]  Additional Information: none

## 2023-07-26 ENCOUNTER — HOSPITAL ENCOUNTER (OUTPATIENT)
Dept: RADIOLOGY | Facility: HOSPITAL | Age: 73
Discharge: HOME | End: 2023-07-26
Attending: OBSTETRICS & GYNECOLOGY
Payer: MEDICARE

## 2023-07-26 DIAGNOSIS — Z12.31 SCREENING MAMMOGRAM FOR BREAST CANCER: ICD-10-CM

## 2023-07-26 PROCEDURE — 77067 SCR MAMMO BI INCL CAD: CPT | Mod: 26,,, | Performed by: RADIOLOGY

## 2023-07-26 PROCEDURE — 77063 BREAST TOMOSYNTHESIS BI: CPT | Mod: 26,,, | Performed by: RADIOLOGY

## 2023-07-26 PROCEDURE — 77063 MAMMO DIGITAL SCREENING BILAT WITH TOMO: ICD-10-PCS | Mod: 26,,, | Performed by: RADIOLOGY

## 2023-07-26 PROCEDURE — 77067 MAMMO DIGITAL SCREENING BILAT WITH TOMO: ICD-10-PCS | Mod: 26,,, | Performed by: RADIOLOGY

## 2023-07-26 PROCEDURE — 77067 SCR MAMMO BI INCL CAD: CPT | Mod: TC,PO

## 2023-08-02 ENCOUNTER — PATIENT MESSAGE (OUTPATIENT)
Dept: OBSTETRICS AND GYNECOLOGY | Facility: CLINIC | Age: 73
End: 2023-08-02
Payer: MEDICARE

## 2023-09-20 ENCOUNTER — OFFICE VISIT (OUTPATIENT)
Dept: OBSTETRICS AND GYNECOLOGY | Facility: CLINIC | Age: 73
End: 2023-09-20
Payer: MEDICARE

## 2023-09-20 VITALS
SYSTOLIC BLOOD PRESSURE: 134 MMHG | HEIGHT: 61 IN | BODY MASS INDEX: 33.25 KG/M2 | WEIGHT: 176.13 LBS | DIASTOLIC BLOOD PRESSURE: 82 MMHG

## 2023-09-20 DIAGNOSIS — Z79.890 POSTMENOPAUSAL HRT (HORMONE REPLACEMENT THERAPY): ICD-10-CM

## 2023-09-20 DIAGNOSIS — Z12.31 VISIT FOR SCREENING MAMMOGRAM: ICD-10-CM

## 2023-09-20 DIAGNOSIS — Z01.419 WOMEN'S ANNUAL ROUTINE GYNECOLOGICAL EXAMINATION: Primary | ICD-10-CM

## 2023-09-20 PROCEDURE — 3288F FALL RISK ASSESSMENT DOCD: CPT | Mod: CPTII,S$GLB,, | Performed by: OBSTETRICS & GYNECOLOGY

## 2023-09-20 PROCEDURE — G0101 PR CA SCREEN;PELVIC/BREAST EXAM: ICD-10-PCS | Mod: GZ,S$GLB,, | Performed by: OBSTETRICS & GYNECOLOGY

## 2023-09-20 PROCEDURE — 3288F PR FALLS RISK ASSESSMENT DOCUMENTED: ICD-10-PCS | Mod: CPTII,S$GLB,, | Performed by: OBSTETRICS & GYNECOLOGY

## 2023-09-20 PROCEDURE — 3075F SYST BP GE 130 - 139MM HG: CPT | Mod: CPTII,S$GLB,, | Performed by: OBSTETRICS & GYNECOLOGY

## 2023-09-20 PROCEDURE — 1160F RVW MEDS BY RX/DR IN RCRD: CPT | Mod: CPTII,S$GLB,, | Performed by: OBSTETRICS & GYNECOLOGY

## 2023-09-20 PROCEDURE — 3079F PR MOST RECENT DIASTOLIC BLOOD PRESSURE 80-89 MM HG: ICD-10-PCS | Mod: CPTII,S$GLB,, | Performed by: OBSTETRICS & GYNECOLOGY

## 2023-09-20 PROCEDURE — 1126F PR PAIN SEVERITY QUANTIFIED, NO PAIN PRESENT: ICD-10-PCS | Mod: CPTII,S$GLB,, | Performed by: OBSTETRICS & GYNECOLOGY

## 2023-09-20 PROCEDURE — 1159F MED LIST DOCD IN RCRD: CPT | Mod: CPTII,S$GLB,, | Performed by: OBSTETRICS & GYNECOLOGY

## 2023-09-20 PROCEDURE — G0101 CA SCREEN;PELVIC/BREAST EXAM: HCPCS | Mod: GZ,S$GLB,, | Performed by: OBSTETRICS & GYNECOLOGY

## 2023-09-20 PROCEDURE — 1159F PR MEDICATION LIST DOCUMENTED IN MEDICAL RECORD: ICD-10-PCS | Mod: CPTII,S$GLB,, | Performed by: OBSTETRICS & GYNECOLOGY

## 2023-09-20 PROCEDURE — 1126F AMNT PAIN NOTED NONE PRSNT: CPT | Mod: CPTII,S$GLB,, | Performed by: OBSTETRICS & GYNECOLOGY

## 2023-09-20 PROCEDURE — 3008F BODY MASS INDEX DOCD: CPT | Mod: CPTII,S$GLB,, | Performed by: OBSTETRICS & GYNECOLOGY

## 2023-09-20 PROCEDURE — 99999 PR PBB SHADOW E&M-EST. PATIENT-LVL III: CPT | Mod: PBBFAC,,, | Performed by: OBSTETRICS & GYNECOLOGY

## 2023-09-20 PROCEDURE — 1101F PR PT FALLS ASSESS DOC 0-1 FALLS W/OUT INJ PAST YR: ICD-10-PCS | Mod: CPTII,S$GLB,, | Performed by: OBSTETRICS & GYNECOLOGY

## 2023-09-20 PROCEDURE — 1160F PR REVIEW ALL MEDS BY PRESCRIBER/CLIN PHARMACIST DOCUMENTED: ICD-10-PCS | Mod: CPTII,S$GLB,, | Performed by: OBSTETRICS & GYNECOLOGY

## 2023-09-20 PROCEDURE — 3008F PR BODY MASS INDEX (BMI) DOCUMENTED: ICD-10-PCS | Mod: CPTII,S$GLB,, | Performed by: OBSTETRICS & GYNECOLOGY

## 2023-09-20 PROCEDURE — 3075F PR MOST RECENT SYSTOLIC BLOOD PRESS GE 130-139MM HG: ICD-10-PCS | Mod: CPTII,S$GLB,, | Performed by: OBSTETRICS & GYNECOLOGY

## 2023-09-20 PROCEDURE — 99999 PR PBB SHADOW E&M-EST. PATIENT-LVL III: ICD-10-PCS | Mod: PBBFAC,,, | Performed by: OBSTETRICS & GYNECOLOGY

## 2023-09-20 PROCEDURE — 3079F DIAST BP 80-89 MM HG: CPT | Mod: CPTII,S$GLB,, | Performed by: OBSTETRICS & GYNECOLOGY

## 2023-09-20 PROCEDURE — 1101F PT FALLS ASSESS-DOCD LE1/YR: CPT | Mod: CPTII,S$GLB,, | Performed by: OBSTETRICS & GYNECOLOGY

## 2023-09-20 RX ORDER — ESCITALOPRAM OXALATE 10 MG/1
1 TABLET ORAL DAILY
COMMUNITY
Start: 2023-04-03

## 2023-09-20 RX ORDER — ESTRADIOL 1 MG/1
1 TABLET ORAL DAILY
Qty: 90 TABLET | Refills: 4 | Status: SHIPPED | OUTPATIENT
Start: 2023-09-20

## 2023-09-20 NOTE — PROGRESS NOTES
"Emily John is a 72 y.o. year old  who presents for annual exam.  She is S/P SOBEIDA / BSO in 2006 and continues on Estrace 1 mg without any issues.  Denies bleeding, hot flashes, and night sweats.  Family history of mother with ovarian cancer.  Reports having rotavirus with prolonged diarrhea which has now resolved.  Otherwise, denies recent changes in her medical / surgical history.  No GYN complaints.    Blood pressure 134/82, height 5' 1" (1.549 m), weight 79.9 kg (176 lb 2.4 oz).    23 Mammogram: Negative, TC 5.47%      Past Medical History:   Diagnosis Date    Acid reflux     Breast cyst     Hypertension     Mental disorder        Past Surgical History:   Procedure Laterality Date    ANKLE SURGERY       SECTION      FINGER SURGERY      thumb    HYSTERECTOMY      OOPHORECTOMY      ROTATOR CUFF REPAIR         OB History          3    Para   3    Term   3            AB        Living   3         SAB        IAB        Ectopic        Multiple        Live Births   3                 ROS:  GENERAL: Feeling well overall.   SKIN: Denies rash or lesions.   HEAD: Denies head injury or headache.   NODES: Denies enlarged lymph nodes.   CHEST: Denies chest pain or shortness of breath.   CARDIOVASCULAR: Denies palpitations or left sided chest pain.   ABDOMEN: Denies abdominal pain.  Prior diarrhea has resolved.  URINARY: No dysuria or hematuria.  REPRODUCTIVE: See HPI.   BREASTS: Denies pain, lumps, or nipple discharge.   HEMATOLOGIC: No easy bruisability or excessive bleeding.   MUSCULOSKELETAL: Reports some joint discomfort.   NEUROLOGIC: Denies syncope or weakness.   PSYCHIATRIC: Denies depression.    PE:  (chaperone present during entire exam)  APPEARANCE: Well nourished, well developed, in no acute distress.  NODES: No inguinal lymph node enlargement.  ABDOMEN: Soft. No tenderness or masses.   BREASTS: Symmetrical, no skin changes or visible lesions. No palpable masses, nipple discharge or " adenopathy bilaterally.  PELVIC: Atrophic external female genitalia without lesions. Normal hair distribution. Adequate perineal body, normal urethral meatus. Vagina atrophic without lesions or discharge. No significant cystocele or rectocele. Uterus and cervix surgically absent. Bimanual exam revealed no masses, tenderness or abnormality.  ANUS: Normal.    Diagnosis:  1. Women's annual routine gynecological examination    2. Postmenopausal HRT (hormone replacement therapy)    3. Visit for screening mammogram          PLAN:    Orders Placed This Encounter    Mammo Digital Screening Bilat w/ Arsen    estradioL (ESTRACE) 1 MG tablet       Patient was counseled today on postmenopausal issues and her usgae of Estrace: r / b / studies, WHI.  She feels that she is doing well on ERT and desires to continue.    Follow-up in 1 year.

## 2024-08-21 ENCOUNTER — HOSPITAL ENCOUNTER (OUTPATIENT)
Dept: RADIOLOGY | Facility: HOSPITAL | Age: 74
Discharge: HOME | End: 2024-08-21
Attending: OBSTETRICS & GYNECOLOGY
Payer: MEDICARE

## 2024-08-21 DIAGNOSIS — Z12.31 VISIT FOR SCREENING MAMMOGRAM: ICD-10-CM

## 2024-08-21 PROCEDURE — 77067 SCR MAMMO BI INCL CAD: CPT | Mod: TC,PO

## 2024-09-03 ENCOUNTER — PATIENT MESSAGE (OUTPATIENT)
Dept: OBSTETRICS AND GYNECOLOGY | Facility: CLINIC | Age: 74
End: 2024-09-03
Payer: MEDICARE

## 2024-09-25 ENCOUNTER — OFFICE VISIT (OUTPATIENT)
Dept: OBSTETRICS AND GYNECOLOGY | Facility: CLINIC | Age: 74
End: 2024-09-25
Payer: MEDICARE

## 2024-09-25 VITALS
BODY MASS INDEX: 34.58 KG/M2 | HEIGHT: 61 IN | SYSTOLIC BLOOD PRESSURE: 150 MMHG | WEIGHT: 183.19 LBS | DIASTOLIC BLOOD PRESSURE: 84 MMHG

## 2024-09-25 DIAGNOSIS — N39.46 MIXED STRESS AND URGE INCONTINENCE: ICD-10-CM

## 2024-09-25 DIAGNOSIS — Z90.722 HISTORY OF HYSTERECTOMY WITH BILATERAL OOPHORECTOMY: ICD-10-CM

## 2024-09-25 DIAGNOSIS — Z79.890 POSTMENOPAUSAL HRT (HORMONE REPLACEMENT THERAPY): ICD-10-CM

## 2024-09-25 DIAGNOSIS — Z90.710 HISTORY OF HYSTERECTOMY WITH BILATERAL OOPHORECTOMY: ICD-10-CM

## 2024-09-25 DIAGNOSIS — Z01.419 WOMEN'S ANNUAL ROUTINE GYNECOLOGICAL EXAMINATION: Primary | ICD-10-CM

## 2024-09-25 PROCEDURE — 99999 PR PBB SHADOW E&M-EST. PATIENT-LVL III: CPT | Mod: PBBFAC,,, | Performed by: OBSTETRICS & GYNECOLOGY

## 2024-09-25 PROCEDURE — 87086 URINE CULTURE/COLONY COUNT: CPT | Performed by: OBSTETRICS & GYNECOLOGY

## 2024-09-25 RX ORDER — ESTRADIOL 1 MG/1
1 TABLET ORAL DAILY
Qty: 90 TABLET | Refills: 4 | Status: SHIPPED | OUTPATIENT
Start: 2024-09-25

## 2024-09-25 RX ORDER — TRAZODONE HYDROCHLORIDE 50 MG/1
50 TABLET ORAL NIGHTLY
COMMUNITY

## 2024-09-25 NOTE — PROGRESS NOTES
"Emily John is a 73 y.o. year old  who presents for annual exam.  S/P SOBEIDA / BSO .  She continues on Estrace 1 mg without any issues.  Denies bleeding, flashes, and sweats.  She has a family history of ovarian cancer.  Over the past year, she has noted a significant increase in urinary incontinence, both with cough / sneeze as well as with urgency / frequency.  She is now wearing a pad daily due to the incontinence.  Recent mammogram was negative     Blood pressure (!) 150/84, height 5' 1" (1.549 m), weight 83.1 kg (183 lb 3.2 oz).    24 Mammogram: Negative, TC 4.7%    Past Medical History:   Diagnosis Date    Acid reflux     Breast cyst     Hypertension     Mental disorder        Past Surgical History:   Procedure Laterality Date    ANKLE SURGERY       SECTION      FINGER SURGERY      thumb    HYSTERECTOMY      OOPHORECTOMY      ROTATOR CUFF REPAIR         OB History          3    Para   3    Term   3            AB        Living   3         SAB        IAB        Ectopic        Multiple        Live Births   3                 ROS:  GENERAL: Feeling well overall.   SKIN: Denies rash or lesions.   HEAD: Denies head injury or headache.   NODES: Denies enlarged lymph nodes.   CHEST: Denies chest pain or shortness of breath.   CARDIOVASCULAR: Denies palpitations or left sided chest pain.   ABDOMEN: No abdominal pain, nausea, vomiting or rectal bleeding.   URINARY: No dysuria or hematuria.  REPRODUCTIVE: See HPI.   BREASTS: Denies pain, lumps, or nipple discharge.   HEMATOLOGIC: No easy bruisability or excessive bleeding.   MUSCULOSKELETAL: Reports some joint discomfort.   NEUROLOGIC: Denies syncope or weakness.   PSYCHIATRIC: Denies depression.    PE:  (chaperone present during entire exam)  APPEARANCE: Well nourished, well developed, in no acute distress.  NODES: No inguinal lymph node enlargement.  ABDOMEN: Soft. No tenderness or masses. No hernias.  BREASTS: Symmetrical, no skin " changes or visible lesions. No palpable masses, nipple discharge or adenopathy bilaterally.  PELVIC: Atrophic external female genitalia without lesions. Normal hair distribution. Adequate perineal body, normal urethral meatus. Vagina atrophic without lesions or discharge. Mild cystocele. Uterus and cervix surgically absent. Bimanual exam revealed no masses, tenderness or abnormality.  ANUS: Normal.    Diagnosis:  1. Women's annual routine gynecological examination    2. Postmenopausal HRT (hormone replacement therapy)    3. History of hysterectomy with bilateral oophorectomy    4. Mixed stress and urge incontinence          PLAN:    Orders Placed This Encounter    Urine culture    Ambulatory referral/consult to Urogynecology    estradioL (ESTRACE) 1 MG tablet       Patient was counseled today on postmenopausal issues.  We reviewed her usage of ERT: r / b / studies, WHI.  She feels that she is doing well on Estrace and desires to continue.  We also discussed her mixed urinary incontinence for which she desires referral to Urogynecology for evaluation.  Urine will be sent for culture.    Follow-up in 1 year.    This note was created with voice recognition software.  Grammatical, syntax and spelling errors may be inevitable.

## 2024-09-27 LAB
BACTERIA UR CULT: NORMAL
BACTERIA UR CULT: NORMAL

## 2024-09-28 ENCOUNTER — PATIENT MESSAGE (OUTPATIENT)
Dept: OBSTETRICS AND GYNECOLOGY | Facility: CLINIC | Age: 74
End: 2024-09-28
Payer: MEDICARE

## 2025-01-13 ENCOUNTER — OFFICE VISIT (OUTPATIENT)
Dept: UROGYNECOLOGY | Facility: CLINIC | Age: 75
End: 2025-01-13
Attending: OBSTETRICS & GYNECOLOGY
Payer: MEDICARE

## 2025-01-13 VITALS
WEIGHT: 180.75 LBS | BODY MASS INDEX: 34.13 KG/M2 | RESPIRATION RATE: 12 BRPM | HEIGHT: 61 IN | SYSTOLIC BLOOD PRESSURE: 157 MMHG | OXYGEN SATURATION: 100 % | DIASTOLIC BLOOD PRESSURE: 85 MMHG

## 2025-01-13 DIAGNOSIS — N89.8 VAGINAL DRYNESS: ICD-10-CM

## 2025-01-13 DIAGNOSIS — N39.46 MIXED STRESS AND URGE INCONTINENCE: Primary | ICD-10-CM

## 2025-01-13 PROCEDURE — 99214 OFFICE O/P EST MOD 30 MIN: CPT | Mod: 25,S$GLB,, | Performed by: NURSE PRACTITIONER

## 2025-01-13 PROCEDURE — 3077F SYST BP >= 140 MM HG: CPT | Mod: CPTII,S$GLB,, | Performed by: NURSE PRACTITIONER

## 2025-01-13 PROCEDURE — 3008F BODY MASS INDEX DOCD: CPT | Mod: CPTII,S$GLB,, | Performed by: NURSE PRACTITIONER

## 2025-01-13 PROCEDURE — 1101F PT FALLS ASSESS-DOCD LE1/YR: CPT | Mod: CPTII,S$GLB,, | Performed by: NURSE PRACTITIONER

## 2025-01-13 PROCEDURE — 3288F FALL RISK ASSESSMENT DOCD: CPT | Mod: CPTII,S$GLB,, | Performed by: NURSE PRACTITIONER

## 2025-01-13 PROCEDURE — 1159F MED LIST DOCD IN RCRD: CPT | Mod: CPTII,S$GLB,, | Performed by: NURSE PRACTITIONER

## 2025-01-13 PROCEDURE — 1126F AMNT PAIN NOTED NONE PRSNT: CPT | Mod: CPTII,S$GLB,, | Performed by: NURSE PRACTITIONER

## 2025-01-13 PROCEDURE — 3079F DIAST BP 80-89 MM HG: CPT | Mod: CPTII,S$GLB,, | Performed by: NURSE PRACTITIONER

## 2025-01-13 PROCEDURE — 99999 PR PBB SHADOW E&M-EST. PATIENT-LVL V: CPT | Mod: PBBFAC,,, | Performed by: NURSE PRACTITIONER

## 2025-01-13 PROCEDURE — 51701 INSERT BLADDER CATHETER: CPT | Mod: S$GLB,,, | Performed by: NURSE PRACTITIONER

## 2025-01-13 PROCEDURE — 1160F RVW MEDS BY RX/DR IN RCRD: CPT | Mod: CPTII,S$GLB,, | Performed by: NURSE PRACTITIONER

## 2025-01-13 RX ORDER — ESTRADIOL 0.1 MG/G
1 CREAM VAGINAL DAILY
Qty: 42.5 G | Refills: 3 | Status: SHIPPED | OUTPATIENT
Start: 2025-01-13

## 2025-01-13 NOTE — PROGRESS NOTES
[facility], [place], operated by [facility] - UROGYNECOLOGY  [address] [address]    Emily John  [id number]  1950  2025    Consulting Physician: Alexis Miranda MD     Primary M.D.: Shyam Stevenson MD    No chief complaint on file.      HPI:     1)  UI:  (+) SERGIO < (+) UUI  X worse over the last  1years.  (+) pads:2/day, usually moderate wetness.  Daytime frequency: Q 1 hours.  Nocturia: Yes: 1/night.  Has improved with trazodone  (--) dysuria,  (--) hematuria,  (--) frequent UTIs.  (+) complete bladder emptying.     2)  POP:  Present. above introitus.  Symptoms:(--)  .  (--) vaginal bleeding. (--) vaginal discharge. (--) sexually active.   (--)  Vaginal dryness.  (--) vaginal estrogen use.     3)  BM:  (--) constipation/straining.  (--) chronic diarrhea. (--) hematochezia.  (--) fecal incontinence.  (--) fecal smearing/urgency.  (+) complete evacuation.  Irregular bowel habits    Past Medical History  Past Medical History:   Diagnosis Date    Acid reflux     Breast cyst     Hypertension     Mental disorder         Past Surgical History  Past Surgical History:   Procedure Laterality Date    ANKLE SURGERY       SECTION      FINGER SURGERY      thumb    HYSTERECTOMY      OOPHORECTOMY      ROTATOR CUFF REPAIR          Hysterectomy: Yes - Date: .  Indication: metrorrhagia.    Type: xlap  Cervix present: No  Ovaries present:no  Other procedures at time of hysterectomy:  n/a    Past Ob History     C/s x 2.      Gynecologic History  LMP: No LMP recorded (lmp unknown). Patient has had a hysterectomy.  Age of menarche: 16  Age of menopause: 45  Menstrual history: metrorrhagia  Pap test: 2014 normal no ecc.  History of abnormal paps: No.  History of STIs:  No  Mammogram: Date of last: 2024.  Result: Normal  Colonoscopy: Date of last: .  Result:  normal per patient report.  Repeat due:  10 years.    DEXA:  Date of last: 2023.  Result:  normal .     Family History  Family  History   Problem Relation Name Age of Onset    Ovarian cancer Mother      Breast cancer Neg Hx      Colon cancer Neg Hx        Colon CA: No  Breast CA: No  GYN CA: Yes - mother -- uterine   CA: No    Social History  Social History     Tobacco Use   Smoking Status Former    Current packs/day: 1.00    Average packs/day: 1 pack/day for 10.0 years (10.0 ttl pk-yrs)    Types: Cigarettes   Smokeless Tobacco Former   .    Social History     Substance and Sexual Activity   Alcohol Use No   .    Social History     Substance and Sexual Activity   Drug Use No   .      Allergies  Review of patient's allergies indicates:   Allergen Reactions    Bactrim [sulfamethoxazole-trimethoprim] Nausea And Vomiting    Sulfa (sulfonamide antibiotics) Rash    Penicillins Other (See Comments)     unknown       Medications  Current Outpatient Medications on File Prior to Visit   Medication Sig Dispense Refill    atorvastatin (LIPITOR) 10 MG tablet Take 10 mg by mouth once daily.      cetirizine (ZYRTEC) 10 MG tablet Take 10 mg by mouth nightly.      escitalopram (LEXAPRO) 10 MG tablet Take 10 mg by mouth once daily.      estradioL (ESTRACE) 1 MG tablet Take 1 tablet (1 mg total) by mouth once daily. 90 tablet 4    fexofenadine (ALLEGRA) 180 MG tablet Take 180 mg by mouth every morning.      glucosamine-chondroitin 500-400 mg tablet Take 1 tablet by mouth.      hydroCHLOROthiazide (MICROZIDE) 12.5 mg capsule Take 12.5 mg by mouth once daily.      ibuprofen (ADVIL,MOTRIN) 600 MG tablet Take 1 tablet (600 mg total) by mouth every 6 (six) hours as needed for Pain. 20 tablet 0    Lactobacillus acidophilus (PROBIOTIC ORAL) Take by mouth.      lansoprazole (PREVACID) 30 MG capsule Take 30 mg by mouth once daily.      montelukast (SINGULAIR) 10 mg tablet Take 10 mg by mouth every evening.      multivitamin with minerals tablet Take by mouth.      potassium 99 mg Tab Take by mouth.      traZODone (DESYREL) 50 MG tablet Take 50 mg by mouth every  "evening.      calcium-vitamin D tablet 600 mg-200 units Take by mouth.      meclizine (ANTIVERT) 12.5 mg tablet        No current facility-administered medications on file prior to visit.       Review of Systems A 14 point ROS was reviewed with pertinent positives as noted above in the history of present illness.      Constitutional: negative  Eyes: negative  Endocrine: negative  Gastrointestinal: negative  Cardiovascular: negative  Respiratory: negative  Allergic/Immunologic: negative  Integumentary: negative  Psychiatric: negative  Musculoskeletal: negative   Ear/Nose/Throat: negative  Neurologic: negative  Genitourinary: SEE HPI  Hematologic/Lymphatic: negative   Breast: negative    Urogynecologic Exam  BP (!) 157/85 (BP Location: Left arm, Patient Position: Sitting)   Resp 12   Ht 5' 1" (1.549 m)   Wt 82 kg (180 lb 12.4 oz)   LMP  (LMP Unknown)   SpO2 100%   BMI 34.16 kg/m²     GENERAL APPEARANCE:  The patient is well-developed, well-nourished.   Neck:  Supple with no thyromegaly, no carotid bruits.  Heart:  Regular rate and rhythm, no murmurs, rubs or gallops.  Lungs:  Clear.  No CVA tenderness.  Abdomen:  Soft, nontender, nondistended, no hepatosplenomegaly.  Incisions:  low transverse    PELVIC:    External genitalia:  Normal Bartholins, Skenes and labia bilaterally.    Urethra:  No caruncle, diverticulum or masses.  (+) hypermobility.    Vagina:  Atrophy (+) , no bladder masses or tender, no discharge.    Cervix:  absent  Uterus: uterus absent  Adnexa: Not palpable.    POP-Q:  Aa -2; Ba -2; C -7; Ap -3; Bp -3; D n/a.  Genital hiatus 4, perineal body 2 total vaginal length 7.  Deferred.  No obvious POP present with valsalva.     NEUROLOGIC:  Cranial nerves 2 through 12 intact.  Strength 5/5.  DTRs 2+ lower extremities.  S2 through 4 normal.  Sacral reflexes intact.    EXT: SPEAR, 2+ pulses bilaterally, no C/C/E    COUGH STRESS TEST:  positive   KEGEL: 2 /5--uses accessory muscles first    RECTAL:  "   External:  Normal, (--) hemorrhoids, (--) dovetailing.   Internal:   deferred    PVR: 10 mL    Impression    1. Mixed stress and urge incontinence    2. Vaginal dryness        Initial Plan  The patient was counseled regarding these issues. The patient was given a summary sheet containing each of these issues with possible options for evaluation and management. When appropriate, we also reviewed computer-generated diagrams specific to their diagnoses..  All questions were addressed to the patient's satisfaction.     1)  Mixed urinary incontinence, urge > stress:    --Empty bladder every 3 hours.  Empty well: wait a minute, lean forward on toilet.    --Avoid dietary irritants (see sheet).  Keep diary x 3-5 days to determine your irritants.  --start pelvic floor PT with  [facility]: (p) [phone]. (f) [phone]. Established patients call:  [phone].  --URGE: consider anticholinergic.   Takes 2-4 weeks to see if will have effect.  For dry mouth: get sour, sugar free lozenge or gum.    --STRESS:  Pessary vs. Sling.   --      2)vaginal dryness  --start vaginal estrogen cream   --use dime size amount in vagina-- insert to your second knuckle and rub around just inside vaginal opening nightly x 2 weeks then twice weekly    3)Constipation:  --hydrate well  --Start daily fiber.  Take 1 tsp of fiber powder (psyllium or other sugar-free powder).  Mix in 8 oz of water.  Take x 3-5 days.  Then, increase fiber by 1 tsp every 3-5 days until stool is easy to pass.  Stop and continue at that dose.   Do not exceed 6 tsps/day.  May also use over the counter stool softener 1-2 x/day.  AVOID laxatives.    4)RTC for follow up in 3 months      I spent a total of 30 minutes on the day of the visit.  This includes face to face time and non-face to face time preparing to see the patient (eg, review of tests), obtaining and/or reviewing separately obtained history, documenting clinical information in the  electronic or other health record, independently interpreting results and communicating results to the patient/family/caregiver, or care coordinator.     Thank you for requesting consultation of your patient.  I look forward to participating in their care.    Nadege Mathews  Female Pelvic Medicine and Reconstructive Surgery  [facility] [place], [place]

## 2025-01-13 NOTE — PATIENT INSTRUCTIONS
1)  Mixed urinary incontinence, urge > stress:    --Empty bladder every 3 hours.  Empty well: wait a minute, lean forward on toilet.    --Avoid dietary irritants (see sheet).  Keep diary x 3-5 days to determine your irritants.  --KEGELS: do 10 in AM and 10 in PM, holding each x 10 seconds.  When you feel urge to go, STOP, KEGEL, and when urge has passed, then go to bathroom.  Consider PT in future.    --URGE: consider anticholinergic.   Takes 2-4 weeks to see if will have effect.  For dry mouth: get sour, sugar free lozenge or gum.    --STRESS:  Pessary vs. Sling.        2)vaginal dryness  --start vaginal estrogen cream   --use dime size amount in vagina-- insert to your second knuckle and rub around just inside vaginal opening nightly x 2 weeks then twice weekly    3)Constipation:  --hydrate well  --Start daily fiber.  Take 1 tsp of fiber powder (psyllium or other sugar-free powder).  Mix in 8 oz of water.  Take x 3-5 days.  Then, increase fiber by 1 tsp every 3-5 days until stool is easy to pass.  Stop and continue at that dose.   Do not exceed 6 tsps/day.  May also use over the counter stool softener 1-2 x/day.  AVOID laxatives.    4)RTC for follow up in 3 months    Bladder Irritants  Certain foods and drinks have been associated with worsening symptoms of urinary frequency, urgency, urge incontinence, or  bladder pain. If you suffer from any of these conditions, you may wish to try eliminating one or more of these foods from your  diet and see if your symptoms improve. If bladder symptoms are related to dietary factors, strict adherence to a diet that  eliminates the food should bring marked relief in 10 days. Once you are feeling better, you can begin to add foods back into  your diet, one at a time. If symptoms return, you will be able to identify the irritant. As you add foods back to your diet it is  very important that you drink significant amounts of water.  Low-acid fruit substitutions include apricots,  papaya, pears and watermelon. Coffee drinkers can drink Kava or other lowacid  instant drinks. Tea drinkers can substitute non-citrus herbal and sun brewed teas. Calcium carbonate co-buffered with  calcium ascorbate can be substituted for Vitamin C. Prelief is a dietary supplement that works as an acid blocker for the  bladder.  Where to get more information:   Overcoming Bladder Disorders by Reanna Cook and Maegan Judd, 1990   You Dont Have to Live with Cystitis! By Leesa Justice, 1988  List of Common Bladder Irritants*  Alcoholic beverages  Apples and apple juice  Cantaloupe  Carbonated beverages  Chili and spicy foods  Chocolate  Citrus fruit  Coffee (including decaffeinated)  Cranberries and cranberry juice  Grapes  Guava  Milk Products: milk, cheese, cottage cheese, yogurt, ice cream  Peaches  Pineapple  Plums  Strawberries  Sugar especially artificial sweeteners, saccharin, aspartame, corn sweeteners, honey, fructose, sucrose, lactose  Tea  Tomatoes and tomato juice  Vitamin B complex  Vinegar  *Most people are not sensitive to ALL of these products; your goal is to find the foods that make YOUR  symptoms worse

## 2025-03-18 ENCOUNTER — PATIENT MESSAGE (OUTPATIENT)
Dept: REHABILITATION | Facility: HOSPITAL | Age: 75
End: 2025-03-18
Payer: MEDICARE

## 2025-03-27 ENCOUNTER — CLINICAL SUPPORT (OUTPATIENT)
Dept: REHABILITATION | Facility: OTHER | Age: 75
End: 2025-03-27
Payer: MEDICARE

## 2025-03-27 DIAGNOSIS — N39.46 MIXED STRESS AND URGE INCONTINENCE: ICD-10-CM

## 2025-03-27 DIAGNOSIS — M62.89 PELVIC FLOOR DYSFUNCTION: Primary | ICD-10-CM

## 2025-03-27 PROCEDURE — 97161 PT EVAL LOW COMPLEX 20 MIN: CPT | Mod: PN

## 2025-03-27 PROCEDURE — 97530 THERAPEUTIC ACTIVITIES: CPT | Mod: PN

## 2025-03-27 NOTE — PROGRESS NOTES
Outpatient Rehab   Physical Therapy Evaluation     Patient Name: Emily John  MRN: [id number]  YOB: 1950  Today's Date: 3/27/2025    Therapy Diagnosis:   Encounter Diagnosis   Name Primary?    Mixed stress and urge incontinence      Referring Provider: Nadege Mathews NP    Referring Provider Orders: Eval and Treat  Medical Diagnosis from Referral: Mixed stress and urge incontinence [N39.46]   Visit # / Visits Authorized: pending  Date of Evaluation:  3/27/2025   Insurance Authorization Period: expires 2025  Plan of Care Certification: 3/27/2025 to 2025    Time In: 1122  Time Out: 12:00  Total Time: 38 minutes  Total Billable Time: 38 minutes    ------------------------------------------------------------------------------------------  Subjective  History of Present Illness  Emily is a 74 y.o. female who reports to physical therapy with a chief concern of bladder leakage.     The patient reports a medical diagnosis of Mixed stress and urge incontinence [N39.46]     History of Present Condition/Illness: Emily reports Mostly having leakage with urgency but also notices with laugh cough sneeze or bending over to pick-up things. At times her full bladder empties.    Past Medical History/Physical Systems Review:   Emily has a current medication list which includes the following prescription(s): atorvastatin, cetirizine, escitalopram oxalate, estradiol, estradiol, fexofenadine, glucosamine-chondroitin, hydrochlorothiazide, ibuprofen, lactobacillus acidophilus, lansoprazole, montelukast, multivitamin with minerals, potassium, and trazodone.    According to the patient's chart, Emily has a past medical history of Acid reflux, Breast cyst, Hypertension, and Mental disorder. Emily has a past surgical history that includes  section; Hysterectomy; Ankle surgery; Rotator cuff repair; Finger surgery; and Oophorectomy.      OB/GYN History:    caesarean  Vaginal Dryness or  Irritation: No  Using vaginal estrogen cream: has cream but  Sexually active: No   Not looking to return has older     Bladder History  Bladder leakage: Yes  Precipitating factors for leakage: urge urinary incontinence + laugh cough sneeze or bending over to pick-up things.  Frequency of incidents: multiple times per day  Uses always pads 2-4  Urinary Urgency: Yes  Frequency of urination:   Day: roughly every 1-2 hours sometimes more often           Night: 1  Difficulty initiating urine stream: No  Urine stream: strong  Incomplete emptying: No  Post-void dribbling: Yes  Hover over toilet seats: No    Bowel History  Frequency of bowel movements: 1-2 times a day  Quality/shape of BM:  soft well formed easy to pass      Sexual/Pain History  Pain with vaginal exams, intercourse or tampon use? Yes  Feeling of pelvic heaviness or pressure? No    Pain  Location: bilateral lateral-posterior hip & low back pain left sometimes worse also reports some left sciatic symptoms  Pain Qualities: achy  Pain-Relieving Factors: stops moving for a few minutes and it calms down  Pain-Aggravating Factors: when walking faster towards the grocery store    Treatment History  no prior pelvic floor therapy  However had a prior physical therapist that had some pelvic focus but wasn't a specialist     Employment  Not employed    Current exercise  Not currently    Types of fluid intake: 3x6-8oz water, diet coke 3x12 oz  Diet: unremarkable    Abuse/Neglect: none reported     ------------------------------------------------------------------------------------------  Objective   Pt provided verbal consent for evaluation.  Chaperone: declined      Hip Strength - Planes of Motion    Right Strength Right Pain Left Strength Left  Pain   Flexion 4-   4+     Extension         ABduction         ADduction           Internal Rotation 4-   4-   +   External Rotation 4-   4-        Hip Range of Motion   Right Hip - IR limited flexion + er WNL  Left Hip  - WNL      Intake Outcome Measure for FOTO Survey     Therapist reviewed FOTO scores for Emily John on 3/27/2025.   FOTO report - see Media section or FOTO account episode details.         Treatment   (TrA = transverse abdominis, PFM = pelvic floor muscle, sEMG = surface electromyography, RUSI = Rehabilitative Ultrasound Imaging)     Therapeutic activities  Patient Education: pt prognosis, PT plan of care, pelvic floor anatomy & function, pelvic floor muscle assessment, relationship between hips & PFM, and relationship between pelvic floor dysfunction & lumbar spine/hip/pelvic girdle dysfunction  HEP building/HEP review    ------------------------------------------------------------------------------------------  Assessment & Plan     Emily presents with a condition of low complexity.   Presentation of Symptoms: Stable  Will Comorbidities Impact Care: No     Functional Limitations: continence and pain with ADLs  Impairments: activity intolerance, impaired physical strength, and lack of appropriate home exercise program  Personal Factors Affecting Prognosis: no deficits     Patient Goal for Therapy (PT): Improve symptoms  Prognosis: good    Assessment Details: Emily is a 74 y.o. female referred to outpatient physical therapy with a medical diagnosis of Mixed stress and urge incontinence [N39.46]  and additional complaints of increased urinary frequency and urgency, pain with pelvic exams, and bilateral hip and low back pain. Symptoms impair the patient's ability to perform activities of daily living and reduce their quality of life.    No direct pelvic floor examination performed today, but pt presentation and subjective report suggest pelvic floor dysfunction.  Pt presents with hip muscle weakness and hip range of motion deficits.    Pt will benefit from pelvic floor muscle training, bladder habit retraining, core/hip strengthening, patient education, manual therapy interventions, and functional  retraining      Goals  Short Term Goals - Start:  3/27/2025    Expected End:  5/19/2025  Pt to report >50% improvement in urine leakage   Pt to report urinary frequency of no more than every 1.25 hours to demonstrate improved bladder control   Pt to demonstrate bilateral hip strength of at least 4/5 for improved pelvic girdle support with load transfer   Pt to be independent with introductory home exercise program     Long Term Goals - Start:  3/27/2025    Expected End:  6/19/2025   Pt to deny urinary incontinence to demonstrate improved pelvic floor coordination   Pt to report urinary frequency of no more than every 2 hours to demonstrate improved bladder control   Pt to demonstrate bilateral hip strength of at least 4+/5 for improved pelvic girdle support with load transfer   Pt to be independent with advanced home exercise program        Plan  From a physical therapy perspective, the patient would benefit from: Skilled Rehab Services     Planned therapy interventions include: Therapeutic exercise, Therapeutic activities, Neuromuscular re-education, Manual therapy, ADLs/IADLs   Planned modalities to include: Electrical stimulation - attended and Electrical stimulation - passive/unattended.         Visit Frequency: 1-2 times Per Week for 12 Weeks.     This plan was discussed with Patient.   Discussion participants: Agreed Upon Plan of Care      Cira Lorenz, PT, DPT

## 2025-03-27 NOTE — PATIENT INSTRUCTIONS
"Bridging, 2-3 sets of 10  - Inhale to prepare, relax the belly and pelvic floor  - As you exhale, feel the lower belly draw down  - Holding the belly button in, lift the hips (only lift as high as it is comfortable) and lower  - Inhale again to rest  *Don't hold your breath        Exhale as you squeeze the ball /pillow hold for 5 count     "Squeeze Before you Sneeze!"    Perform a kegel before coughing, laughing, sneezing, lifting, bending, squatting, pushing, pulling, getting out of bed, standing, etc.  Try not to hold your breath!              "